# Patient Record
Sex: FEMALE | Race: BLACK OR AFRICAN AMERICAN | NOT HISPANIC OR LATINO
[De-identification: names, ages, dates, MRNs, and addresses within clinical notes are randomized per-mention and may not be internally consistent; named-entity substitution may affect disease eponyms.]

---

## 2017-02-13 ENCOUNTER — RX RENEWAL (OUTPATIENT)
Age: 57
End: 2017-02-13

## 2017-03-06 ENCOUNTER — RX RENEWAL (OUTPATIENT)
Age: 57
End: 2017-03-06

## 2017-03-09 ENCOUNTER — APPOINTMENT (OUTPATIENT)
Dept: CARDIOLOGY | Facility: CLINIC | Age: 57
End: 2017-03-09

## 2017-03-09 ENCOUNTER — NON-APPOINTMENT (OUTPATIENT)
Age: 57
End: 2017-03-09

## 2017-03-09 VITALS
WEIGHT: 190 LBS | SYSTOLIC BLOOD PRESSURE: 151 MMHG | HEIGHT: 60 IN | BODY MASS INDEX: 37.3 KG/M2 | HEART RATE: 71 BPM | DIASTOLIC BLOOD PRESSURE: 89 MMHG | OXYGEN SATURATION: 98 %

## 2017-05-15 ENCOUNTER — RX RENEWAL (OUTPATIENT)
Age: 57
End: 2017-05-15

## 2017-06-12 ENCOUNTER — RX RENEWAL (OUTPATIENT)
Age: 57
End: 2017-06-12

## 2017-08-11 ENCOUNTER — RX RENEWAL (OUTPATIENT)
Age: 57
End: 2017-08-11

## 2017-09-14 ENCOUNTER — APPOINTMENT (OUTPATIENT)
Dept: CARDIOLOGY | Facility: CLINIC | Age: 57
End: 2017-09-14

## 2017-12-07 ENCOUNTER — NON-APPOINTMENT (OUTPATIENT)
Age: 57
End: 2017-12-07

## 2017-12-07 ENCOUNTER — APPOINTMENT (OUTPATIENT)
Dept: CARDIOLOGY | Facility: CLINIC | Age: 57
End: 2017-12-07
Payer: COMMERCIAL

## 2017-12-07 VITALS
HEART RATE: 67 BPM | HEIGHT: 60 IN | OXYGEN SATURATION: 98 % | BODY MASS INDEX: 33.96 KG/M2 | SYSTOLIC BLOOD PRESSURE: 146 MMHG | WEIGHT: 173 LBS | DIASTOLIC BLOOD PRESSURE: 83 MMHG

## 2017-12-07 PROCEDURE — 93000 ELECTROCARDIOGRAM COMPLETE: CPT

## 2017-12-07 PROCEDURE — 99214 OFFICE O/P EST MOD 30 MIN: CPT

## 2017-12-22 ENCOUNTER — OTHER (OUTPATIENT)
Age: 57
End: 2017-12-22

## 2018-05-17 ENCOUNTER — NON-APPOINTMENT (OUTPATIENT)
Age: 58
End: 2018-05-17

## 2018-05-17 ENCOUNTER — APPOINTMENT (OUTPATIENT)
Dept: CARDIOLOGY | Facility: CLINIC | Age: 58
End: 2018-05-17
Payer: COMMERCIAL

## 2018-05-17 VITALS
SYSTOLIC BLOOD PRESSURE: 150 MMHG | WEIGHT: 175 LBS | HEART RATE: 58 BPM | OXYGEN SATURATION: 99 % | HEIGHT: 60 IN | BODY MASS INDEX: 34.36 KG/M2 | DIASTOLIC BLOOD PRESSURE: 78 MMHG

## 2018-05-17 PROCEDURE — 93000 ELECTROCARDIOGRAM COMPLETE: CPT

## 2018-05-17 PROCEDURE — 99214 OFFICE O/P EST MOD 30 MIN: CPT

## 2018-05-29 ENCOUNTER — RX RENEWAL (OUTPATIENT)
Age: 58
End: 2018-05-29

## 2018-12-10 ENCOUNTER — RX RENEWAL (OUTPATIENT)
Age: 58
End: 2018-12-10

## 2019-02-14 ENCOUNTER — NON-APPOINTMENT (OUTPATIENT)
Age: 59
End: 2019-02-14

## 2019-02-14 ENCOUNTER — APPOINTMENT (OUTPATIENT)
Dept: CARDIOLOGY | Facility: CLINIC | Age: 59
End: 2019-02-14
Payer: COMMERCIAL

## 2019-02-14 VITALS
WEIGHT: 188 LBS | SYSTOLIC BLOOD PRESSURE: 153 MMHG | HEART RATE: 80 BPM | DIASTOLIC BLOOD PRESSURE: 85 MMHG | HEIGHT: 60 IN | OXYGEN SATURATION: 99 % | BODY MASS INDEX: 36.91 KG/M2

## 2019-02-14 PROCEDURE — 93000 ELECTROCARDIOGRAM COMPLETE: CPT

## 2019-02-14 PROCEDURE — 99214 OFFICE O/P EST MOD 30 MIN: CPT

## 2019-02-14 NOTE — PHYSICAL EXAM
[General Appearance - Well Developed] : well developed [Normal Appearance] : normal appearance [Well Groomed] : well groomed [General Appearance - Well Nourished] : well nourished [No Deformities] : no deformities [General Appearance - In No Acute Distress] : no acute distress [Normal Conjunctiva] : the conjunctiva exhibited no abnormalities [Eyelids - No Xanthelasma] : the eyelids demonstrated no xanthelasmas [Normal Oral Mucosa] : normal oral mucosa [No Oral Pallor] : no oral pallor [No Oral Cyanosis] : no oral cyanosis [Normal Jugular Venous A Waves Present] : normal jugular venous A waves present [Normal Jugular Venous V Waves Present] : normal jugular venous V waves present [No Jugular Venous Nathan A Waves] : no jugular venous nathan A waves [Respiration, Rhythm And Depth] : normal respiratory rhythm and effort [Exaggerated Use Of Accessory Muscles For Inspiration] : no accessory muscle use [Auscultation Breath Sounds / Voice Sounds] : lungs were clear to auscultation bilaterally [Heart Rate And Rhythm] : heart rate and rhythm were normal [Heart Sounds] : normal S1 and S2 [Murmurs] : no murmurs present [Abdomen Soft] : soft [Abdomen Tenderness] : non-tender [Abdomen Mass (___ Cm)] : no abdominal mass palpated [Abnormal Walk] : normal gait [Gait - Sufficient For Exercise Testing] : the gait was sufficient for exercise testing [Nail Clubbing] : no clubbing of the fingernails [Cyanosis, Localized] : no localized cyanosis [Petechial Hemorrhages (___cm)] : no petechial hemorrhages [Skin Color & Pigmentation] : normal skin color and pigmentation [] : no rash [No Venous Stasis] : no venous stasis [Skin Lesions] : no skin lesions [No Skin Ulcers] : no skin ulcer [No Xanthoma] : no  xanthoma was observed [Oriented To Time, Place, And Person] : oriented to person, place, and time [Affect] : the affect was normal [Mood] : the mood was normal [No Anxiety] : not feeling anxious

## 2019-02-14 NOTE — HISTORY OF PRESENT ILLNESS
[FreeTextEntry1] : Rae returning for follow-up.\par No chest pains or tightness\par No palpitations\par Hasnt lost weight, doesn’t exercise\par Notes high BP recently\par Labs reviewed (A1C 6.1, LDL upto 98)\par \par

## 2019-02-14 NOTE — REVIEW OF SYSTEMS
[Anxiety] : anxiety [Negative] : Heme/Lymph [Dyspnea on exertion] : not dyspnea during exertion [Chest Pain] : no chest pain

## 2019-02-14 NOTE — DISCUSSION/SUMMARY
[FreeTextEntry1] : Cont with plavix due to bifurcation stents.\par Encouraged exercise and weight loss (if ldl remains at this level would consider uptitrating statin Rx)\par Change from Metoprolol to Chlorthalidone for BP control.\par F/u 6 mo\par To call me in 1-2 weeks with BP diary\par

## 2019-05-15 ENCOUNTER — RX RENEWAL (OUTPATIENT)
Age: 59
End: 2019-05-15

## 2019-06-11 ENCOUNTER — RX RENEWAL (OUTPATIENT)
Age: 59
End: 2019-06-11

## 2019-08-03 ENCOUNTER — INPATIENT (INPATIENT)
Facility: HOSPITAL | Age: 59
LOS: 0 days | Discharge: HOME | DRG: 309 | End: 2019-08-04
Attending: HOSPITALIST | Admitting: HOSPITALIST
Payer: COMMERCIAL

## 2019-08-03 VITALS
WEIGHT: 186.07 LBS | HEART RATE: 72 BPM | SYSTOLIC BLOOD PRESSURE: 125 MMHG | RESPIRATION RATE: 20 BRPM | OXYGEN SATURATION: 100 % | TEMPERATURE: 98 F | HEIGHT: 60 IN | DIASTOLIC BLOOD PRESSURE: 80 MMHG

## 2019-08-03 DIAGNOSIS — I25.10 ATHEROSCLEROTIC HEART DISEASE OF NATIVE CORONARY ARTERY WITHOUT ANGINA PECTORIS: Chronic | ICD-10-CM

## 2019-08-03 DIAGNOSIS — Z00.00 ENCOUNTER FOR GENERAL ADULT MEDICAL EXAMINATION WITHOUT ABNORMAL FINDINGS: ICD-10-CM

## 2019-08-03 DIAGNOSIS — I25.10 ATHEROSCLEROTIC HEART DISEASE OF NATIVE CORONARY ARTERY WITHOUT ANGINA PECTORIS: ICD-10-CM

## 2019-08-03 DIAGNOSIS — N17.9 ACUTE KIDNEY FAILURE, UNSPECIFIED: ICD-10-CM

## 2019-08-03 DIAGNOSIS — I10 ESSENTIAL (PRIMARY) HYPERTENSION: ICD-10-CM

## 2019-08-03 DIAGNOSIS — M25.511 PAIN IN RIGHT SHOULDER: ICD-10-CM

## 2019-08-03 DIAGNOSIS — R00.2 PALPITATIONS: ICD-10-CM

## 2019-08-03 LAB
ALBUMIN SERPL ELPH-MCNC: 4.7 G/DL — SIGNIFICANT CHANGE UP (ref 3.3–5)
ALP SERPL-CCNC: 217 U/L — HIGH (ref 40–120)
ALT FLD-CCNC: 24 U/L — SIGNIFICANT CHANGE UP (ref 10–45)
ANION GAP SERPL CALC-SCNC: 13 MMOL/L — SIGNIFICANT CHANGE UP (ref 5–17)
APPEARANCE UR: CLEAR — SIGNIFICANT CHANGE UP
AST SERPL-CCNC: 25 U/L — SIGNIFICANT CHANGE UP (ref 10–40)
BACTERIA # UR AUTO: NEGATIVE — SIGNIFICANT CHANGE UP
BASOPHILS # BLD AUTO: 0.1 K/UL — SIGNIFICANT CHANGE UP (ref 0–0.2)
BASOPHILS NFR BLD AUTO: 1.5 % — SIGNIFICANT CHANGE UP (ref 0–2)
BILIRUB SERPL-MCNC: 0.3 MG/DL — SIGNIFICANT CHANGE UP (ref 0.2–1.2)
BILIRUB UR-MCNC: NEGATIVE — SIGNIFICANT CHANGE UP
BUN SERPL-MCNC: 39 MG/DL — HIGH (ref 7–23)
CALCIUM SERPL-MCNC: 9.9 MG/DL — SIGNIFICANT CHANGE UP (ref 8.4–10.5)
CHLORIDE SERPL-SCNC: 97 MMOL/L — SIGNIFICANT CHANGE UP (ref 96–108)
CO2 SERPL-SCNC: 29 MMOL/L — SIGNIFICANT CHANGE UP (ref 22–31)
COLOR SPEC: SIGNIFICANT CHANGE UP
CREAT ?TM UR-MCNC: 222 MG/DL — SIGNIFICANT CHANGE UP
CREAT SERPL-MCNC: 1.67 MG/DL — HIGH (ref 0.5–1.3)
DIFF PNL FLD: NEGATIVE — SIGNIFICANT CHANGE UP
EOSINOPHIL # BLD AUTO: 0.1 K/UL — SIGNIFICANT CHANGE UP (ref 0–0.5)
EOSINOPHIL NFR BLD AUTO: 1.5 % — SIGNIFICANT CHANGE UP (ref 0–6)
EPI CELLS # UR: 2 /HPF — SIGNIFICANT CHANGE UP
GLUCOSE SERPL-MCNC: 113 MG/DL — HIGH (ref 70–99)
GLUCOSE UR QL: NEGATIVE — SIGNIFICANT CHANGE UP
HCT VFR BLD CALC: 40.6 % — SIGNIFICANT CHANGE UP (ref 34.5–45)
HGB BLD-MCNC: 12.9 G/DL — SIGNIFICANT CHANGE UP (ref 11.5–15.5)
HYALINE CASTS # UR AUTO: 1 /LPF — SIGNIFICANT CHANGE UP (ref 0–2)
KETONES UR-MCNC: NEGATIVE — SIGNIFICANT CHANGE UP
LEUKOCYTE ESTERASE UR-ACNC: ABNORMAL
LYMPHOCYTES # BLD AUTO: 2.9 K/UL — SIGNIFICANT CHANGE UP (ref 1–3.3)
LYMPHOCYTES # BLD AUTO: 38.6 % — SIGNIFICANT CHANGE UP (ref 13–44)
MCHC RBC-ENTMCNC: 28.5 PG — SIGNIFICANT CHANGE UP (ref 27–34)
MCHC RBC-ENTMCNC: 31.9 GM/DL — LOW (ref 32–36)
MCV RBC AUTO: 89.4 FL — SIGNIFICANT CHANGE UP (ref 80–100)
MONOCYTES # BLD AUTO: 0.6 K/UL — SIGNIFICANT CHANGE UP (ref 0–0.9)
MONOCYTES NFR BLD AUTO: 8.1 % — SIGNIFICANT CHANGE UP (ref 2–14)
NEUTROPHILS # BLD AUTO: 3.8 K/UL — SIGNIFICANT CHANGE UP (ref 1.8–7.4)
NEUTROPHILS NFR BLD AUTO: 50.3 % — SIGNIFICANT CHANGE UP (ref 43–77)
NITRITE UR-MCNC: NEGATIVE — SIGNIFICANT CHANGE UP
PH UR: 6.5 — SIGNIFICANT CHANGE UP (ref 5–8)
PLATELET # BLD AUTO: 448 K/UL — HIGH (ref 150–400)
POTASSIUM SERPL-MCNC: 3.4 MMOL/L — LOW (ref 3.5–5.3)
POTASSIUM SERPL-SCNC: 3.4 MMOL/L — LOW (ref 3.5–5.3)
PROT SERPL-MCNC: 8.6 G/DL — HIGH (ref 6–8.3)
PROT UR-MCNC: ABNORMAL
RBC # BLD: 4.54 M/UL — SIGNIFICANT CHANGE UP (ref 3.8–5.2)
RBC # FLD: 12.2 % — SIGNIFICANT CHANGE UP (ref 10.3–14.5)
RBC CASTS # UR COMP ASSIST: 4 /HPF — SIGNIFICANT CHANGE UP (ref 0–4)
SODIUM SERPL-SCNC: 139 MMOL/L — SIGNIFICANT CHANGE UP (ref 135–145)
SODIUM UR-SCNC: 89 MMOL/L — SIGNIFICANT CHANGE UP
SP GR SPEC: 1.02 — SIGNIFICANT CHANGE UP (ref 1.01–1.02)
TROPONIN T, HIGH SENSITIVITY RESULT: 6 NG/L — SIGNIFICANT CHANGE UP (ref 0–51)
TROPONIN T, HIGH SENSITIVITY RESULT: 7 NG/L — SIGNIFICANT CHANGE UP (ref 0–51)
UROBILINOGEN FLD QL: NEGATIVE — SIGNIFICANT CHANGE UP
WBC # BLD: 7.5 K/UL — SIGNIFICANT CHANGE UP (ref 3.8–10.5)
WBC # FLD AUTO: 7.5 K/UL — SIGNIFICANT CHANGE UP (ref 3.8–10.5)
WBC UR QL: 3 /HPF — SIGNIFICANT CHANGE UP (ref 0–5)

## 2019-08-03 PROCEDURE — 71045 X-RAY EXAM CHEST 1 VIEW: CPT | Mod: 26

## 2019-08-03 PROCEDURE — 99285 EMERGENCY DEPT VISIT HI MDM: CPT | Mod: 25

## 2019-08-03 PROCEDURE — 93010 ELECTROCARDIOGRAM REPORT: CPT

## 2019-08-03 PROCEDURE — 99223 1ST HOSP IP/OBS HIGH 75: CPT | Mod: GC

## 2019-08-03 RX ORDER — CLOPIDOGREL BISULFATE 75 MG/1
75 TABLET, FILM COATED ORAL DAILY
Refills: 0 | Status: DISCONTINUED | OUTPATIENT
Start: 2019-08-03 | End: 2019-08-04

## 2019-08-03 RX ORDER — HYDRALAZINE HCL 50 MG
5 TABLET ORAL EVERY 4 HOURS
Refills: 0 | Status: DISCONTINUED | OUTPATIENT
Start: 2019-08-03 | End: 2019-08-04

## 2019-08-03 RX ORDER — SODIUM CHLORIDE 9 MG/ML
1000 INJECTION INTRAMUSCULAR; INTRAVENOUS; SUBCUTANEOUS ONCE
Refills: 0 | Status: COMPLETED | OUTPATIENT
Start: 2019-08-03 | End: 2019-08-03

## 2019-08-03 RX ORDER — ACETAMINOPHEN 500 MG
975 TABLET ORAL ONCE
Refills: 0 | Status: COMPLETED | OUTPATIENT
Start: 2019-08-03 | End: 2019-08-03

## 2019-08-03 RX ORDER — SODIUM CHLORIDE 9 MG/ML
1000 INJECTION INTRAMUSCULAR; INTRAVENOUS; SUBCUTANEOUS
Refills: 0 | Status: DISCONTINUED | OUTPATIENT
Start: 2019-08-03 | End: 2019-08-04

## 2019-08-03 RX ORDER — SIMVASTATIN 20 MG/1
10 TABLET, FILM COATED ORAL AT BEDTIME
Refills: 0 | Status: DISCONTINUED | OUTPATIENT
Start: 2019-08-03 | End: 2019-08-04

## 2019-08-03 RX ORDER — POTASSIUM CHLORIDE 20 MEQ
20 PACKET (EA) ORAL ONCE
Refills: 0 | Status: COMPLETED | OUTPATIENT
Start: 2019-08-03 | End: 2019-08-03

## 2019-08-03 RX ADMIN — SODIUM CHLORIDE 70 MILLILITER(S): 9 INJECTION INTRAMUSCULAR; INTRAVENOUS; SUBCUTANEOUS at 22:47

## 2019-08-03 RX ADMIN — CLOPIDOGREL BISULFATE 75 MILLIGRAM(S): 75 TABLET, FILM COATED ORAL at 22:46

## 2019-08-03 RX ADMIN — SIMVASTATIN 10 MILLIGRAM(S): 20 TABLET, FILM COATED ORAL at 22:46

## 2019-08-03 RX ADMIN — Medication 20 MILLIEQUIVALENT(S): at 09:40

## 2019-08-03 RX ADMIN — Medication 975 MILLIGRAM(S): at 09:40

## 2019-08-03 RX ADMIN — SODIUM CHLORIDE 1000 MILLILITER(S): 9 INJECTION INTRAMUSCULAR; INTRAVENOUS; SUBCUTANEOUS at 09:41

## 2019-08-03 NOTE — H&P ADULT - NSICDXPASTSURGICALHX_GEN_ALL_CORE_FT
PAST SURGICAL HISTORY:  CAD (coronary artery disease) S/p Stent to LAD 2015    H/O:      H/O: hysterectomy

## 2019-08-03 NOTE — H&P ADULT - PROBLEM SELECTOR PLAN 3
Given brief self resolving episode of palpitations most likely SVT vs a fib or other tachyarrhythmia. Will monitor on tele overnight.  -check TSH.

## 2019-08-03 NOTE — H&P ADULT - NSHPPHYSICALEXAM_GEN_ALL_CORE
GENERAL: NAD, lying in bed comfortably  HEAD:  Atraumatic, Normocephalic  EYES: EOMI, PERRLA, conjunctiva and sclera clear  ENT: Moist mucous membranes  NECK: Supple, No JVD  CHEST/LUNG: Clear to auscultation bilaterally; No rales, rhonchi, wheezing, or rubs. Unlabored respirations  HEART: Regular rate and rhythm; normal S1 and S2. No murmurs, rubs, or gallops  ABDOMEN: Bowel sounds present; Soft, Nontender, Nondistended. No hepatomegally  EXTREMITIES:  2+ Peripheral Pulses, brisk capillary refill. No clubbing, cyanosis, or edema  NERVOUS SYSTEM:  Alert & Oriented X3, speech clear. No deficits   MSK: FROM all 4 extremities, full and equal strength. Drop can test negative. Patient with tenderness to palpation at the pec minor bilaterally. Sharp grinding pain on shoulder abduction and external rotation.   SKIN: No rashes or lesions

## 2019-08-03 NOTE — ED ADULT NURSE NOTE - OBJECTIVE STATEMENT
58YOF pmh HTN, HLD, stents 2015 presents to ED c/o "rapid heartbeat." Pt state she went to the park on Thursday and felt weak/ diaphoretic and came home. Pt states she started having "rapid hearbeat" yesterday and has not feeling normal so came to the ED today. Pt also endorses nausea. Denies CP, jaw, arm pain, SOB, fever, chills, abd pain, burning upon urination. Pt put on cardiac monitor. EKG done. Safety and comfort measures provided. Will continue to monitor.

## 2019-08-03 NOTE — H&P ADULT - ASSESSMENT
Ms. Benjamin is a 58 woman w/HTN, HLD, CAD (s/p stent to LAD and D1) presents who is presenting with a brief episode of palpitations that has now resolved.

## 2019-08-03 NOTE — H&P ADULT - HISTORY OF PRESENT ILLNESS
Ms. Benjamin is a 58 woman w/HTN, HLD, CAD (s/p stent to LAD and D1) presents with palpitations. Her symptoms started yesterday by 7:30am when she was out for her usual morning walk. She had a sudden onset of palpitations, SOB, weakness, lightheadedness, and diaphoresis. Her symptoms did not improve with rest when she stopped walking. Her symptoms persisted for about 5 minutes while she walked home and did not resolve until she got home and lied down. She had no chest pain at any point. She has had no symptoms since that time including no chest pain. She has however had reduced appetite and nausea through that day. She was still tolerating foods PO and fluids but just had a reduced appetite. She does have a good appetite now. She did not have any caffeine prior to her presentation. She is active at her baseline and goes to the gym and goes on the elliptical. This presentation has been distinctly different than her past admissions for CAD. No history of thyroid disease. She has good follow up with cardiology and follows every 6 months. Her next appointment is in 6 days on Friday. Endorses ongoing left shoulder pain for the past month. She denies any urinary symptoms.        with palpitations associated with nausea, diaphoresis and weakness while patient was walking. Patient also had these symptoms while at rest. She had a normal stress test in 2016. No caffein.  No chest pain/neck pain/jaw pain. No vomiting. No SOB/orthopnea/PND. She has an appointment with her cardiologist in 3 days. Ms. Benjamin is a 58 woman w/HTN, HLD, CAD (s/p stent to LAD and D1) presents with palpitations. Her symptoms started yesterday by 7:30am when she was out for her usual morning walk. She had a sudden onset of palpitations, SOB, weakness, lightheadedness, and diaphoresis. Her symptoms did not improve with rest when she stopped walking. Her symptoms persisted for about 5 minutes while she walked home and did not resolve until she got home and lied down. She had no chest pain at any point. She has had no symptoms since that time including no chest pain. She has however had reduced appetite and nausea through that day. She was still tolerating foods PO and fluids but just had a reduced appetite. She does have a good appetite now. She did not have any caffeine prior to her presentation. She is active at her baseline and goes to the gym and goes on the elliptical. This presentation has been distinctly different than her past admissions for CAD. No history of thyroid disease. She has good follow up with cardiology and follows every 6 months. Her next appointment is in 6 days on Friday. Endorses ongoing left shoulder pain for the past month. She denies any urinary symptoms.

## 2019-08-03 NOTE — ED PROVIDER NOTE - PHYSICAL EXAMINATION
GENERAL: NAD  HEAD:  Atraumatic, Normocephalic  EYES: EOMI, conjunctiva and sclera clear  NECK: Supple, No JVD  CHEST/LUNG: Clear to auscultation bilaterally; No wheeze  HEART: Regular rate and rhythm; No murmurs, rubs, or gallops  ABDOMEN: Soft, Nontender, Nondistended; Bowel sounds present  EXTREMITIES:  2+ Peripheral Pulses, No clubbing, cyanosis, or edema  PSYCH: AAOx3  NEUROLOGY: non-focal  SKIN: No rashes or lesions GENERAL: NAD  HEAD:  Atraumatic, Normocephalic  EYES: EOMI, conjunctiva and sclera clear  NECK: Supple, No JVD  CHEST/LUNG: Clear to auscultation bilaterally; No wheeze  HEART: Regular rate and rhythm; No murmurs, rubs, or gallops  ABDOMEN: Soft, Nontender, Nondistended; Bowel sounds present  EXTREMITIES:  2+ Peripheral Pulses, No clubbing, cyanosis, or edema  MSK: Tenderness on anterior shoulder   PSYCH: AAOx3  NEUROLOGY: non-focal  SKIN: No rashes or lesions

## 2019-08-03 NOTE — ED PROVIDER NOTE - OBJECTIVE STATEMENT
58F w/HTN, HLD, CAD (s/p stent to LAD and D1) presents with palpitations. Symptom started yesterday by 7:30am with palpitations associated with nausea, diaphoresis and weakness while patient was walking. Patient also had these symptoms while at rest. She had a normal stress test in 2016. No caffein. No history of thyroid disease. No chest pain/neck pain/jaw pain. No vomiting. No SOB/orthopnea/PND. 58F w/HTN, HLD, CAD (s/p stent to LAD and D1) presents with palpitations. Symptom started yesterday by 7:30am with palpitations associated with nausea, diaphoresis and weakness while patient was walking. Patient also had these symptoms while at rest. She had a normal stress test in 2016. No caffein. No history of thyroid disease. Endorses left shoulder pain for the past month. No chest pain/neck pain/jaw pain. No vomiting. No SOB/orthopnea/PND. She has an appointment with her cardiologist in 3 days. 58F w/HTN, HLD, CAD (s/p stent to LAD and D1) presents with palpitations. Symptom started yesterday by 7:30am with palpitations associated with nausea, diaphoresis and weakness while patient was walking. Patient also had these symptoms while at rest. She had a normal stress test in 2016. No caffein. No history of thyroid disease. Endorses ongoing left shoulder pain for the past month. No chest pain/neck pain/jaw pain. No vomiting. No SOB/orthopnea/PND. She has an appointment with her cardiologist in 3 days.

## 2019-08-03 NOTE — H&P ADULT - PROBLEM SELECTOR PLAN 2
Trops negative. EKG NSR and unchanged. No CP.   -monitor on tele  -No need for stress test or echo as patient has good follow up and will have outpatient appointment with her cardiologist later this week.

## 2019-08-03 NOTE — ED PROVIDER NOTE - NS ED ROS FT
CONSTITUTIONAL: No fevers   EYES/ENT: No visual changes;  No  throat pain   NECK: No pain   RESPIRATORY: No cough, wheezing, hemoptysis; No shortness of breath  CARDIOVASCULAR: +palpitations. No chest pain.  GASTROINTESTINAL: +nausea. No abdominal pain. vomiting, or hematemesis; No diarrhea or constipation. No melena or hematochezia.  GENITOURINARY: No dysuria, frequency or hematuria  NEUROLOGICAL: No numbness or weakness  SKIN: No itching, rashes

## 2019-08-03 NOTE — H&P ADULT - NSHPSOCIALHISTORY_GEN_ALL_CORE
Pt splits time between [place], [place] and [place]. Tries to get her medical care in [place]. She lives alone in [place] and lives with her son in [place]. She drinks socially, never smoker, no illicit. 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

## 2019-08-03 NOTE — ED PROVIDER NOTE - CLINICAL SUMMARY MEDICAL DECISION MAKING FREE TEXT BOX
58 F w/ PMH Of CAD w/ stents, prediabetes, HTN, p/w near syncope and generalized weakness that occurred yesterday at 730 PM while she was ambulating. PT states she never lost consciousness. Pt denies an cp, sob. States that nearsycopal symptoms stopped when she was resting. Pt reports that she went to bed and when she woke up had continued weakness. Pt denies any fevers, chills, nausea, vomiting. Pt on exam, is nontoxic appearing- pt is not orthostatic, was given IVF, pt has clear lungs and mild edema in the ankles, pt has no abdominal pain, heart is regular rate and rhythm. Pt will have labs, ekg and troponin pt has evidence of tamara will be admitted in addition to cardiac testing and risk stratification for possible cardiac diagnostic testing as the etiology of her exertional near syncope.

## 2019-08-03 NOTE — H&P ADULT - PROBLEM SELECTOR PLAN 1
Cr=1.67  -Baseline=0.74 in 2/2019  FENa 0.5% c/w pre-renal etiology in s/o possible poor PO intake with nausea and loss of appetite. Pt on chlorthalidone so checking FEUrea  -holding chlorthalidone and lisinopril in s/o KIRIT   -s/p bolus of fluid in the ED. Starting 70 cc/hr NS overnight.

## 2019-08-03 NOTE — H&P ADULT - NSHPLABSRESULTS_GEN_ALL_CORE
12.9   7.5   )-----------( 448      ( 03 Aug 2019 08:38 )             40.6         139  |  97  |  39<H>  ----------------------------<  113<H>  3.4<L>   |  29  |  1.67<H>    Ca    9.9      03 Aug 2019 08:38  Phos  3.3       Mg     2.3         TPro  8.6<H>  /  Alb  4.7  /  TBili  0.3  /  DBili  x   /  AST  25  /  ALT  24  /  AlkPhos  217<H>          LIVER FUNCTIONS - ( 03 Aug 2019 08:38 )  Alb: 4.7 g/dL / Pro: 8.6 g/dL / ALK PHOS: 217 U/L / ALT: 24 U/L / AST: 25 U/L / GGT: x             Urinalysis Basic - ( 03 Aug 2019 10:10 )    Color: Light Yellow / Appearance: Clear / S.024 / pH: x  Gluc: x / Ketone: Negative  / Bili: Negative / Urobili: Negative   Blood: x / Protein: Trace / Nitrite: Negative   Leuk Esterase: Small / RBC: 4 /hpf / WBC 3 /HPF   Sq Epi: x / Non Sq Epi: 2 /hpf / Bacteria: Negative          Blood, Urine: Negative ( @ 10:10)      FENa=0.5                      EKG: normal sinus rhythm. Unchanged from prior ekg    RADIOLOGY STUDIES:

## 2019-08-03 NOTE — H&P ADULT - NSHPREVIEWOFSYSTEMS_GEN_ALL_CORE
REVIEW OF SYSTEMS:    CONSTITUTIONAL: No weakness, fevers or chills  EYES/ENT: No visual changes;  No vertigo or throat pain   NECK: No pain or stiffness  RESPIRATORY: No cough, wheezing, hemoptysis; No shortness of breath  CARDIOVASCULAR: No chest pain, + palpitations  GASTROINTESTINAL: No abdominal or epigastric pain. + nausea,. No vomiting, or hematemesis; No diarrhea or constipation. No melena or hematochezia.  GENITOURINARY: No dysuria, frequency or hematuria  NEUROLOGICAL: No numbness or weakness  SKIN: No itching, burning, rashes, or lesions   MSK: She has bilateral shoulder pain that is sharp and worse with should flexion. No tenderness to palpation. Following with PCP for this.   All other review of systems is negative unless indicated above.

## 2019-08-04 ENCOUNTER — TRANSCRIPTION ENCOUNTER (OUTPATIENT)
Age: 59
End: 2019-08-04

## 2019-08-04 VITALS
RESPIRATION RATE: 18 BRPM | DIASTOLIC BLOOD PRESSURE: 62 MMHG | HEART RATE: 58 BPM | OXYGEN SATURATION: 98 % | SYSTOLIC BLOOD PRESSURE: 106 MMHG | TEMPERATURE: 98 F

## 2019-08-04 LAB
ALBUMIN SERPL ELPH-MCNC: 3.7 G/DL — SIGNIFICANT CHANGE UP (ref 3.3–5)
ALP SERPL-CCNC: 178 U/L — HIGH (ref 40–120)
ALT FLD-CCNC: 17 U/L — SIGNIFICANT CHANGE UP (ref 10–45)
ANION GAP SERPL CALC-SCNC: 11 MMOL/L — SIGNIFICANT CHANGE UP (ref 5–17)
AST SERPL-CCNC: 19 U/L — SIGNIFICANT CHANGE UP (ref 10–40)
BILIRUB SERPL-MCNC: 0.4 MG/DL — SIGNIFICANT CHANGE UP (ref 0.2–1.2)
BUN SERPL-MCNC: 24 MG/DL — HIGH (ref 7–23)
CALCIUM SERPL-MCNC: 9.4 MG/DL — SIGNIFICANT CHANGE UP (ref 8.4–10.5)
CHLORIDE SERPL-SCNC: 100 MMOL/L — SIGNIFICANT CHANGE UP (ref 96–108)
CO2 SERPL-SCNC: 27 MMOL/L — SIGNIFICANT CHANGE UP (ref 22–31)
CREAT SERPL-MCNC: 1.05 MG/DL — SIGNIFICANT CHANGE UP (ref 0.5–1.3)
GLUCOSE SERPL-MCNC: 98 MG/DL — SIGNIFICANT CHANGE UP (ref 70–99)
HCT VFR BLD CALC: 34.5 % — SIGNIFICANT CHANGE UP (ref 34.5–45)
HCV AB S/CO SERPL IA: 0.13 S/CO — SIGNIFICANT CHANGE UP (ref 0–0.99)
HCV AB SERPL-IMP: SIGNIFICANT CHANGE UP
HGB BLD-MCNC: 11.2 G/DL — LOW (ref 11.5–15.5)
MAGNESIUM SERPL-MCNC: 1.9 MG/DL — SIGNIFICANT CHANGE UP (ref 1.6–2.6)
MCHC RBC-ENTMCNC: 29.3 PG — SIGNIFICANT CHANGE UP (ref 27–34)
MCHC RBC-ENTMCNC: 32.5 GM/DL — SIGNIFICANT CHANGE UP (ref 32–36)
MCV RBC AUTO: 90.3 FL — SIGNIFICANT CHANGE UP (ref 80–100)
PHOSPHATE SERPL-MCNC: 3.1 MG/DL — SIGNIFICANT CHANGE UP (ref 2.5–4.5)
PLATELET # BLD AUTO: 374 K/UL — SIGNIFICANT CHANGE UP (ref 150–400)
POTASSIUM SERPL-MCNC: 4.1 MMOL/L — SIGNIFICANT CHANGE UP (ref 3.5–5.3)
POTASSIUM SERPL-SCNC: 4.1 MMOL/L — SIGNIFICANT CHANGE UP (ref 3.5–5.3)
PROT SERPL-MCNC: 7 G/DL — SIGNIFICANT CHANGE UP (ref 6–8.3)
RBC # BLD: 3.83 M/UL — SIGNIFICANT CHANGE UP (ref 3.8–5.2)
RBC # FLD: 12 % — SIGNIFICANT CHANGE UP (ref 10.3–14.5)
SODIUM SERPL-SCNC: 138 MMOL/L — SIGNIFICANT CHANGE UP (ref 135–145)
TSH SERPL-MCNC: 0.92 UIU/ML — SIGNIFICANT CHANGE UP (ref 0.27–4.2)
WBC # BLD: 5.7 K/UL — SIGNIFICANT CHANGE UP (ref 3.8–10.5)
WBC # FLD AUTO: 5.7 K/UL — SIGNIFICANT CHANGE UP (ref 3.8–10.5)

## 2019-08-04 PROCEDURE — 82570 ASSAY OF URINE CREATININE: CPT

## 2019-08-04 PROCEDURE — 85027 COMPLETE CBC AUTOMATED: CPT

## 2019-08-04 PROCEDURE — 81001 URINALYSIS AUTO W/SCOPE: CPT

## 2019-08-04 PROCEDURE — 84484 ASSAY OF TROPONIN QUANT: CPT

## 2019-08-04 PROCEDURE — 71045 X-RAY EXAM CHEST 1 VIEW: CPT

## 2019-08-04 PROCEDURE — 84300 ASSAY OF URINE SODIUM: CPT

## 2019-08-04 PROCEDURE — 99285 EMERGENCY DEPT VISIT HI MDM: CPT | Mod: 25

## 2019-08-04 PROCEDURE — 93005 ELECTROCARDIOGRAM TRACING: CPT

## 2019-08-04 PROCEDURE — 99239 HOSP IP/OBS DSCHRG MGMT >30: CPT

## 2019-08-04 PROCEDURE — 84100 ASSAY OF PHOSPHORUS: CPT

## 2019-08-04 PROCEDURE — 84443 ASSAY THYROID STIM HORMONE: CPT

## 2019-08-04 PROCEDURE — 86803 HEPATITIS C AB TEST: CPT

## 2019-08-04 PROCEDURE — 80053 COMPREHEN METABOLIC PANEL: CPT

## 2019-08-04 PROCEDURE — 83735 ASSAY OF MAGNESIUM: CPT

## 2019-08-04 RX ORDER — LISINOPRIL 2.5 MG/1
1 TABLET ORAL
Qty: 30 | Refills: 0
Start: 2019-08-04 | End: 2019-09-02

## 2019-08-04 RX ORDER — SODIUM CHLORIDE 9 MG/ML
1000 INJECTION INTRAMUSCULAR; INTRAVENOUS; SUBCUTANEOUS
Refills: 0 | Status: DISCONTINUED | OUTPATIENT
Start: 2019-08-04 | End: 2019-08-04

## 2019-08-04 RX ADMIN — CLOPIDOGREL BISULFATE 75 MILLIGRAM(S): 75 TABLET, FILM COATED ORAL at 13:18

## 2019-08-04 NOTE — DISCHARGE NOTE PROVIDER - NSFOLLOWUPCLINICS_GEN_ALL_ED_FT
[facility]  Cardiology  [address] [place] [id number]  Phone: [phone]  Fax:   Follow Up Time:

## 2019-08-04 NOTE — PROGRESS NOTE ADULT - SUBJECTIVE AND OBJECTIVE BOX
Cem Aceves  Internal Medicine PGY-1  Pager: [phone] / [phone]      PATIENT:  ELLEN COWART  [id number]    INTERVAL HISTORY/OVERNIGHT EVENTS:  NAEON. NSR on tele    SUBJECTIVE:  No subjective complaints. No CP, palpitations, LH, SOB, dizziness.     OBJECTIVE:    T(C): 36.7 (19 @ 05:01), Max: 37 (19 @ 12:43)  HR: 63 (19 @ 05:01) (59 - 65)  BP: 118/69 (19 @ 05:01) (100/61 - 118/69)  RR: 18 (19 @ 05:01) (18 - 18)  SpO2: 100% (19 @ 05:01) (98% - 100%)      19 @ 07:01  -  19 @ 07:00  --------------------------------------------------------  IN: 480 mL / OUT: 0 mL / NET: 480 mL    19 @ 07:01  -  19 @ 09:47  --------------------------------------------------------  IN: 240 mL / OUT: 0 mL / NET: 240 mL          PHYSICAL EXAM:  GENERAL: NAD, lying in bed comfortably  	HEAD:  Atraumatic, Normocephalic  	EYES: EOMI, PERRLA, conjunctiva and sclera clear  	ENT: Moist mucous membranes  	NECK: Supple, No JVD  	CHEST/LUNG: Clear to auscultation bilaterally; No rales, rhonchi, wheezing, or rubs. Unlabored respirations  	HEART: Regular rate and rhythm; normal S1 and S2. No murmurs, rubs, or gallops  	ABDOMEN: Bowel sounds present; Soft, Nontender, Nondistended. No hepatomegally  	EXTREMITIES:  2+ Peripheral Pulses, brisk capillary refill. No clubbing, cyanosis, or edema  	NERVOUS SYSTEM:  Alert & Oriented X3, speech clear. No deficits   	MSK: FROM all 4 extremities, full and equal strength. Drop can test negative. Patient with tenderness to palpation at the pec minor bilaterally. Sharp grinding pain on shoulder abduction and external rotation.   SKIN: No rashes or lesions            LABS:                          11.2   5.7   )-----------( 374      ( 04 Aug 2019 07:13 )             34.5     08-04    138  |  100  |  24<H>  ----------------------------<  98  4.1   |  27  |  1.05    Ca    9.4      04 Aug 2019 07:13  Phos  3.1     08-04  Mg     1.9     08-04    TPro  7.0  /  Alb  3.7  /  TBili  0.4  /  DBili  x   /  AST  19  /  ALT  17  /  AlkPhos  178<H>  08-04    LIVER FUNCTIONS - ( 04 Aug 2019 07:13 )  Alb: 3.7 g/dL / Pro: 7.0 g/dL / ALK PHOS: 178 U/L / ALT: 17 U/L / AST: 19 U/L / GGT: x                     Urinalysis Basic - ( 03 Aug 2019 10:10 )    Color: Light Yellow / Appearance: Clear / S.024 / pH: x  Gluc: x / Ketone: Negative  / Bili: Negative / Urobili: Negative   Blood: x / Protein: Trace / Nitrite: Negative   Leuk Esterase: Small / RBC: 4 /hpf / WBC 3 /HPF   Sq Epi: x / Non Sq Epi: 2 /hpf / Bacteria: Negative                IMAGING:      MEDICATIONS:  MEDICATIONS  (STANDING):  clopidogrel Tablet 75 milliGRAM(s) Oral daily  simvastatin 10 milliGRAM(s) Oral at bedtime  sodium chloride 0.9%. 1000 milliLiter(s) (70 mL/Hr) IV Continuous <Continuous>    MEDICATIONS  (PRN):  hydrALAZINE 5 milliGRAM(s) Oral every 4 hours PRN SBP >180

## 2019-08-04 NOTE — DISCHARGE NOTE PROVIDER - CARE PROVIDERS DIRECT ADDRESSES
[email or web address]

## 2019-08-04 NOTE — DISCHARGE NOTE PROVIDER - CARE PROVIDER_API CALL
Pat Leonard (MD)  Cardiovascular Disease; Interventional Cardiology  [address] [place] [address]  Phone: [phone]  Fax: [phone]  Follow Up Time:

## 2019-08-04 NOTE — PROGRESS NOTE ADULT - PROBLEM SELECTOR PLAN 1
Cr=1.67  -Baseline=0.74 in 2/2019  FENa 0.5% c/w pre-renal etiology in s/o possible poor PO intake with nausea and loss of appetite. Pt on chlorthalidone so checking FEUrea  -holding chlorthalidone and lisinopril in s/o KIRIT   -s/p bolus of fluid in the ED. 70 cc/hr NS overnight.  -Cr 1.67->1.05

## 2019-08-04 NOTE — PROGRESS NOTE ADULT - PROBLEM SELECTOR PLAN 3
Given brief self resolving episode of palpitations most likely SVT vs a fib or other tachyarrhythmia. Will monitor on tele overnight.  -TSH pending

## 2019-08-04 NOTE — DISCHARGE NOTE PROVIDER - NSDCCPCAREPLAN_GEN_ALL_CORE_FT
PRINCIPAL DISCHARGE DIAGNOSIS  Diagnosis: Palpitation  Assessment and Plan of Treatment: You remained asymptomatic during her hospitalization. Your troponin levels were normal which is a lab test we use to determine if heart cells are dying. You also had an EKG and a chest X-ray that were normal. Given this brief self resolving episode of palpitations it is most likely that you had a breif episode of tachycardia or fast heart beat. When the heart beats very fast it isn't able to fill up with blood in between beats which can cause reduced blood flow and symptoms of weakness, light headedness, and shortness of breath like you described. You were monitored on telemetry which is a continuous heart monitor and everything looked completely normal. We also checked labs to evaluate your thyroid function which can cause tachycardia and those labs were also normal.          SECONDARY DISCHARGE DIAGNOSES  Diagnosis: KIRIT (acute kidney injury)  Assessment and Plan of Treatment: When you were admitted you were found to have a small and reversible injury to your kidneys called an acute kidney injury or KIRIT. We discovered this by checking a lab value called the creatinine. This was due to dehydration, poor fluid intake, and use of your diuretic and other blood pressure meds. We gave you some fluids in the ED and overnight and by the morning on the day of your discharge this had completely resolved. You do not need to do anything to follow up about this.       Diagnosis: Hypertension, unspecified type  Assessment and Plan of Treatment: We held your chlorithalidone and lisinopril in s/o of KIRIT. Your blood pressures were in the 100s to 110s systolic without these meds and so you are being discharged on 10mg lisinopril and no chlorithalidone. You should follow up about this with your cardiologist later this week.

## 2019-08-04 NOTE — PROGRESS NOTE ADULT - ATTENDING COMMENTS
No events on tele. KIRIT resolved w hydration. BP remains low off antihypertensives.     Discharge off Chlorthalidone and on low dose Lisinopril. F/u with Cardiologist this week.     D/c time 40 mins.

## 2019-08-04 NOTE — DISCHARGE NOTE NURSING/CASE MANAGEMENT/SOCIAL WORK - NSDCDPATPORTLINK_GEN_ALL_CORE
You can access the [facility] Patient Portal, offered by [facility], by registering with the following website: [email or web address]

## 2019-08-04 NOTE — PROGRESS NOTE ADULT - PROBLEM SELECTOR PLAN 2
Trops negative. EKG NSR and unchanged. No CP.   -NSR on tele with no events  -No need for stress test or echo as patient has good follow up and will have outpatient appointment with her cardiologist later this week.

## 2019-08-04 NOTE — DISCHARGE NOTE PROVIDER - HOSPITAL COURSE
HPI:    Ms. Benjamin is a 58 woman w/HTN, HLD, CAD (s/p stent to LAD and D1) presents with palpitations. Her symptoms started yesterday by 7:30am when she was out for her usual morning walk. She had a sudden onset of palpitations, SOB, weakness, lightheadedness, and diaphoresis. Her symptoms did not improve with rest when she stopped walking. Her symptoms persisted for about 5 minutes while she walked home and did not resolve until she got home and lied down. She had no chest pain at any point. She has had no symptoms since that time including no chest pain. She has however had reduced appetite and nausea through that day. She was still tolerating foods PO and fluids but just had a reduced appetite. She does have a good appetite now. She did not have any caffeine prior to her presentation. She is active at her baseline and goes to the gym and goes on the elliptical. This presentation has been distinctly different than her past admissions for CAD. No history of thyroid disease. She has good follow up with cardiology and follows every 6 months. Her next appointment is in 6 days on Friday. Endorses ongoing left shoulder pain for the past month. She denies any urinary symptoms.         Hospital Course:    #KIRIT    Her on admission was 1.67. Baseline=0.74 in 2/2019. FENa 0.5% c/w pre-renal etiology in s/o possible poor PO intake with nausea and loss of appetite. Pt on chlorthalidone so urine urea was ordered but still pending at the time of discharge. She was s/p bolus of fluid in the ED and then was given 70 cc/hr NS overnight. We also held her chlorthalidone and lisinopril in the s/o of KIRIT. On hospital day 2 her Cr 1.67->1.05.         #Palpitations    She remained asymptomatic during her hospitalization. Her trops were negative. EKG was Normal sinus rhythm and unchanged from previous. CXR showed clear lungs. Given brief self resolving episode of palpitations most likely SVT vs a fib or other tachyarrhythmia. She was monitored on tele and had normal sinus rhythm with no events. Her TSH was 0.92.         #Hypertension    We held her chlorithalidone and lisinopril in s/o of KIRIT. Her BPs were in the 100s to 110s systolic without these meds and so sh was discharged on 10 lisinopril and no chlorithalidone. She has cardiolloogy follow up later this week.

## 2019-08-09 ENCOUNTER — APPOINTMENT (OUTPATIENT)
Dept: ORTHOPEDIC SURGERY | Facility: CLINIC | Age: 59
End: 2019-08-09
Payer: COMMERCIAL

## 2019-08-09 ENCOUNTER — APPOINTMENT (OUTPATIENT)
Dept: CARDIOLOGY | Facility: CLINIC | Age: 59
End: 2019-08-09
Payer: COMMERCIAL

## 2019-08-09 VITALS
HEIGHT: 60 IN | DIASTOLIC BLOOD PRESSURE: 77 MMHG | WEIGHT: 182 LBS | HEART RATE: 68 BPM | SYSTOLIC BLOOD PRESSURE: 136 MMHG | BODY MASS INDEX: 35.73 KG/M2

## 2019-08-09 VITALS
HEART RATE: 74 BPM | BODY MASS INDEX: 35.73 KG/M2 | DIASTOLIC BLOOD PRESSURE: 83 MMHG | WEIGHT: 182 LBS | SYSTOLIC BLOOD PRESSURE: 138 MMHG | OXYGEN SATURATION: 100 % | HEIGHT: 60 IN

## 2019-08-09 DIAGNOSIS — Z86.39 PERSONAL HISTORY OF OTHER ENDOCRINE, NUTRITIONAL AND METABOLIC DISEASE: ICD-10-CM

## 2019-08-09 PROCEDURE — 99204 OFFICE O/P NEW MOD 45 MIN: CPT | Mod: 25

## 2019-08-09 PROCEDURE — 20610 DRAIN/INJ JOINT/BURSA W/O US: CPT | Mod: LT

## 2019-08-09 PROCEDURE — 99214 OFFICE O/P EST MOD 30 MIN: CPT

## 2019-08-09 PROCEDURE — 73030 X-RAY EXAM OF SHOULDER: CPT | Mod: LT

## 2019-08-09 PROCEDURE — 93000 ELECTROCARDIOGRAM COMPLETE: CPT

## 2019-08-09 NOTE — HISTORY OF PRESENT ILLNESS
[de-identified] : Patient is a pleasant, 58-year-old right-hand dominant female with a history of coronary artery disease, and \par stent placement on Plavix comes in today complaining of Left greater than right shoulder pain.\par Patient reports having right shoulder pain for 6 months, which has overall been improving and her \par left shoulder is an 8/10 pain in getting worse with difficulty bending and extending.\par Her primary care doctor put her on ibuprofen despite being on Plavix and did not help.\par She would like an injection of cortisone today.\par Denies any radicular symptoms or recent injury\par No fever chills sweats nausea vomiting no bowel or bladder dysfunction\par no recent weight loss or gain no night pain. \par This history is in addition to the intake form that I personally reviewed.

## 2019-08-09 NOTE — PROCEDURE
[de-identified] : Procedure:Left shoulder injection Subacromial space\par Indication:  Inflammation. \par Risk, benefits and alternatives were discussed with the patient. \par Potential complications include bleeding, infection and allergic reaction. \par Verbal consent was obtained prior to the procedure. \par Alcohol and Betadine was used to prep the area. ethyl chloride spray was used as a topical anesthetic. \par A 22-gauge was used to inject 6 mL of 1% Lidocaine and 1 mL 40mg/mL methylprednisolone. \par A bandage was applied. \par The patient tolerated the procedure well and gain significant relief\par Complications: None. \par Patient instructed to avoid strenuous activity for 2 day(s). \par Follow-up in the office in 2 week(s).\par The patient expressed significant relief.

## 2019-08-09 NOTE — DISCUSSION/SUMMARY
[de-identified] : Acute left shoulder pain\par Patient opted for cortisone injection today-patient gain significant relief\par She will follow up with Dr. Sherwood in 2-4 weeks if no better\par Options discussed.\par All questions answered.\par The patient is in full agreement with the plan, \par and happy with the visit.\par --------------\par This note was done with a voice recognition transcription \par software and any typos are related to this rather than medical error.\par \par

## 2019-08-09 NOTE — PHYSICAL EXAM
[de-identified] : Left shoulder exam:\par Limited range of motion in all planes with pain\par Positive Akbar, positive empty can, positive Neer\par \par Neurologically intact distally and symmetrical\par otherwise, 5 out of 5 motor strength, sensation is intact and symmetrical full range of motion flexion extension and rotation, no palpatory tenderness full range of motion of hips knees shoulders and elbows (all four extremities), no atrophy, negative straight leg raise, no pathological reflexes, no swelling, normal ambulation, no apparent distress skin is intact, no palpable lymph nodes, no upper or lower extremity instability, alert and oriented x3 and normal mood. Normal finger-to nose test.  [de-identified] : Three-view of the left shoulder are negative for any obvious fracture or dislocation.  The patient understands that this is a wet read and I am not a radiologist.\par If the final read reveals something different than discussed in the office the patient will get a call back in the \par next 24-48 hours to discuss.

## 2019-08-15 ENCOUNTER — APPOINTMENT (OUTPATIENT)
Dept: CARDIOLOGY | Facility: CLINIC | Age: 59
End: 2019-08-15

## 2019-08-31 ENCOUNTER — NON-APPOINTMENT (OUTPATIENT)
Age: 59
End: 2019-08-31

## 2019-08-31 NOTE — HISTORY OF PRESENT ILLNESS
[FreeTextEntry1] : Rae returning for follow-up.\par No chest pains or tightness\par No palpitations \par \par

## 2019-08-31 NOTE — PHYSICAL EXAM
[General Appearance - Well Developed] : well developed [Well Groomed] : well groomed [Normal Appearance] : normal appearance [No Deformities] : no deformities [General Appearance - Well Nourished] : well nourished [General Appearance - In No Acute Distress] : no acute distress [Normal Conjunctiva] : the conjunctiva exhibited no abnormalities [Eyelids - No Xanthelasma] : the eyelids demonstrated no xanthelasmas [Normal Oral Mucosa] : normal oral mucosa [No Oral Pallor] : no oral pallor [No Oral Cyanosis] : no oral cyanosis [Normal Jugular Venous V Waves Present] : normal jugular venous V waves present [Normal Jugular Venous A Waves Present] : normal jugular venous A waves present [No Jugular Venous Nathan A Waves] : no jugular venous nathan A waves [Auscultation Breath Sounds / Voice Sounds] : lungs were clear to auscultation bilaterally [Respiration, Rhythm And Depth] : normal respiratory rhythm and effort [Exaggerated Use Of Accessory Muscles For Inspiration] : no accessory muscle use [Heart Sounds] : normal S1 and S2 [Heart Rate And Rhythm] : heart rate and rhythm were normal [Murmurs] : no murmurs present [Abdomen Soft] : soft [Abdomen Tenderness] : non-tender [Abnormal Walk] : normal gait [Abdomen Mass (___ Cm)] : no abdominal mass palpated [Nail Clubbing] : no clubbing of the fingernails [Gait - Sufficient For Exercise Testing] : the gait was sufficient for exercise testing [Petechial Hemorrhages (___cm)] : no petechial hemorrhages [Cyanosis, Localized] : no localized cyanosis [Skin Color & Pigmentation] : normal skin color and pigmentation [] : no rash [No Skin Ulcers] : no skin ulcer [Skin Lesions] : no skin lesions [No Venous Stasis] : no venous stasis [No Xanthoma] : no  xanthoma was observed [Oriented To Time, Place, And Person] : oriented to person, place, and time [Affect] : the affect was normal [Mood] : the mood was normal [No Anxiety] : not feeling anxious

## 2019-09-09 ENCOUNTER — TRANSCRIPTION ENCOUNTER (OUTPATIENT)
Age: 59
End: 2019-09-09

## 2019-12-13 ENCOUNTER — RX RENEWAL (OUTPATIENT)
Age: 59
End: 2019-12-13

## 2020-09-15 ENCOUNTER — APPOINTMENT (OUTPATIENT)
Dept: ORTHOPEDIC SURGERY | Facility: CLINIC | Age: 60
End: 2020-09-15
Payer: COMMERCIAL

## 2020-09-15 VITALS — TEMPERATURE: 98.3 F

## 2020-09-15 VITALS
DIASTOLIC BLOOD PRESSURE: 78 MMHG | HEART RATE: 67 BPM | BODY MASS INDEX: 35.73 KG/M2 | WEIGHT: 182 LBS | SYSTOLIC BLOOD PRESSURE: 172 MMHG | HEIGHT: 60 IN

## 2020-09-15 DIAGNOSIS — M25.512 PAIN IN LEFT SHOULDER: ICD-10-CM

## 2020-09-15 PROCEDURE — 73030 X-RAY EXAM OF SHOULDER: CPT | Mod: LT

## 2020-09-15 PROCEDURE — 99213 OFFICE O/P EST LOW 20 MIN: CPT

## 2020-09-17 ENCOUNTER — NON-APPOINTMENT (OUTPATIENT)
Age: 60
End: 2020-09-17

## 2020-09-17 ENCOUNTER — APPOINTMENT (OUTPATIENT)
Dept: CARDIOLOGY | Facility: CLINIC | Age: 60
End: 2020-09-17
Payer: COMMERCIAL

## 2020-09-17 VITALS — SYSTOLIC BLOOD PRESSURE: 166 MMHG | OXYGEN SATURATION: 100 % | HEART RATE: 77 BPM | DIASTOLIC BLOOD PRESSURE: 93 MMHG

## 2020-09-17 PROCEDURE — 99213 OFFICE O/P EST LOW 20 MIN: CPT

## 2020-09-17 PROCEDURE — 93000 ELECTROCARDIOGRAM COMPLETE: CPT

## 2020-09-19 NOTE — DISCUSSION/SUMMARY
[FreeTextEntry1] : Cont with plavix due to bifurcation stents.\par Restart diuretic due to severe HTN\par Stress test to eval for restenosis of bifurcation stents.\par F/u 6 mo

## 2020-09-19 NOTE — HISTORY OF PRESENT ILLNESS
[FreeTextEntry1] : Rae returning for follow-up.\par No chest pains or tightness\par No palpitations \par BP remains high\par

## 2020-10-09 ENCOUNTER — OUTPATIENT (OUTPATIENT)
Dept: OUTPATIENT SERVICES | Facility: HOSPITAL | Age: 60
LOS: 1 days | End: 2020-10-09
Payer: COMMERCIAL

## 2020-10-09 ENCOUNTER — APPOINTMENT (OUTPATIENT)
Dept: CV DIAGNOSTICS | Facility: HOSPITAL | Age: 60
End: 2020-10-09

## 2020-10-09 DIAGNOSIS — I25.10 ATHEROSCLEROTIC HEART DISEASE OF NATIVE CORONARY ARTERY WITHOUT ANGINA PECTORIS: Chronic | ICD-10-CM

## 2020-10-09 DIAGNOSIS — I25.10 ATHEROSCLEROTIC HEART DISEASE OF NATIVE CORONARY ARTERY WITHOUT ANGINA PECTORIS: ICD-10-CM

## 2020-10-09 PROCEDURE — 78452 HT MUSCLE IMAGE SPECT MULT: CPT | Mod: 26

## 2020-10-09 PROCEDURE — 78452 HT MUSCLE IMAGE SPECT MULT: CPT

## 2020-10-09 PROCEDURE — 93018 CV STRESS TEST I&R ONLY: CPT

## 2020-10-09 PROCEDURE — A9500: CPT

## 2020-10-09 PROCEDURE — 93017 CV STRESS TEST TRACING ONLY: CPT

## 2020-10-09 PROCEDURE — 93016 CV STRESS TEST SUPVJ ONLY: CPT

## 2020-10-12 RX ORDER — CHLORTHALIDONE 25 MG/1
25 TABLET ORAL DAILY
Qty: 90 | Refills: 3 | Status: DISCONTINUED | COMMUNITY
Start: 2019-02-14 | End: 2020-10-12

## 2020-11-04 PROBLEM — M25.512 ACUTE PAIN OF LEFT SHOULDER: Status: ACTIVE | Noted: 2019-08-12

## 2020-11-04 NOTE — PHYSICAL EXAM
[de-identified] : Oriented to time, place, person\par Mood: Normal\par Affect: Normal\par Appearance: Healthy, well appearing, no acute distress.\par Gait: Normal\par Assistive Devices: None\par \par Left shoulder exam\par \par Inspection: No malalignment, No defects, No atrophy\par Skin: No masses, No lesions\par Neck: Negative Spurlings, full ROM, no pain with ROM\par AROM: FF to 165, abduction to 85, ER to 50, IR to mid lumbar\par Painful arc ROM: Pain with terminal motion\par Tenderness: No bicipital tenderness, positive tenderness to the greater tuberosity/RTC insertion, no anterior shoulder/lesser tuberosity tenderness\par Strength: 5/5 ER, 5/5 IR in adduction, 5/5 supraspinatus testing, [default value] O'Briens test\par AC Joint: No ttp/pain with cross arm testing\par Biceps: Speed Negative, Yergusons Negative\par Impingement test: Positive Akbar, Positive Neer \par Stability: Negative apprehension, negative anterior/posterior load and shift\par Vasc: 2+ radial pulse\par Neuro: AIN, PIN, Ulnar nerve in tact to motor\par Sensation: In tact to light touch throughout\par  [de-identified] : Images were reviewed from [facility] dated 8.9.19. \par \par Multiple views left shoulder showed no evidence of bony injury, or lili dislocation. There is no underlying degenerative arthritic change seen. Overall alignment is maintained. Otherwise unremarkable.

## 2020-11-04 NOTE — HISTORY OF PRESENT ILLNESS
[de-identified] : 59 year old RHD female presents today with acute on chronic left shoulder pain x 1 month. No injury reported. She was evaluated by out after hours team last year, received a cortisone injection which gave her complete relief of pain until recently. The pain is constant worse with internal rotation and overhead reaching. Tylenol provides temporary relief. Denies numbness or tingling. \par \par The patient's past medical history, past surgical history, medications and allergies were reviewed by me today with the patient and documented accordingly. In addition, the patient's family and social history, which were noncontributory to this visit, were reviewed also.

## 2020-11-04 NOTE — DISCUSSION/SUMMARY
[de-identified] : 60-year-old female with left shoulder pain\par \par A discussion was had with the patient regarding potential sources of inflammatory shoulder discomfort; including rotator cuff tendon dysfunction (including tendonitis vs. internal structural damage), subdeltoid/subacromial bursitis, or impingement of the rotator cuff at the acromion. Other contributing sources of pain may be the acromioclavicular joint or long head of the biceps tendon. Irritation is typically caused either by overhead repetitive activity or as a result of minor injury.\par \par Discussed short-term and long-term outcomes as well as the goal of treatment to reduce pain and restore function. Nonsurgical treatment is typically first-line therapy that may take weeks to months to resolve symptoms; includes rest from overhead activities, NSAIDs, home exercise program versus physical therapy to restore normal strength/ROM/function of the shoulder, and possible corticosteroid injection. Also discussed role of arthroscopic surgical intervention when nonsurgical treatment does not adequately relieve pain/inflammation.\par \par Recommendations: Conservative modalities as above (overhead activity rest/activity avoidance until less symptommatic, ice, NSAIDs if tolerated, home exercise strengthening and stretching program). \par \par Physical therapy Rx given for shoulder/RTC strengthening and conditioning program\par \par Followup: 6-8wks as needed if symptoms persist or worsen.

## 2020-11-20 ENCOUNTER — RX RENEWAL (OUTPATIENT)
Age: 60
End: 2020-11-20

## 2020-11-27 ENCOUNTER — RX RENEWAL (OUTPATIENT)
Age: 60
End: 2020-11-27

## 2020-12-28 ENCOUNTER — RX RENEWAL (OUTPATIENT)
Age: 60
End: 2020-12-28

## 2021-06-08 ENCOUNTER — EMERGENCY (EMERGENCY)
Facility: HOSPITAL | Age: 61
LOS: 1 days | Discharge: HOME | End: 2021-06-08
Payer: COMMERCIAL

## 2021-06-08 VITALS
SYSTOLIC BLOOD PRESSURE: 108 MMHG | HEART RATE: 55 BPM | OXYGEN SATURATION: 100 % | TEMPERATURE: 98 F | RESPIRATION RATE: 16 BRPM | DIASTOLIC BLOOD PRESSURE: 68 MMHG

## 2021-06-08 VITALS
HEIGHT: 60 IN | OXYGEN SATURATION: 100 % | RESPIRATION RATE: 18 BRPM | SYSTOLIC BLOOD PRESSURE: 159 MMHG | WEIGHT: 190.04 LBS | DIASTOLIC BLOOD PRESSURE: 90 MMHG | TEMPERATURE: 98 F | HEART RATE: 87 BPM

## 2021-06-08 DIAGNOSIS — I25.10 ATHEROSCLEROTIC HEART DISEASE OF NATIVE CORONARY ARTERY WITHOUT ANGINA PECTORIS: Chronic | ICD-10-CM

## 2021-06-08 LAB
ALBUMIN SERPL ELPH-MCNC: 4.4 G/DL — SIGNIFICANT CHANGE UP (ref 3.3–5)
ALP SERPL-CCNC: 246 U/L — HIGH (ref 40–120)
ALT FLD-CCNC: 15 U/L — SIGNIFICANT CHANGE UP (ref 10–45)
ANION GAP SERPL CALC-SCNC: 14 MMOL/L — SIGNIFICANT CHANGE UP (ref 5–17)
AST SERPL-CCNC: 19 U/L — SIGNIFICANT CHANGE UP (ref 10–40)
BASOPHILS # BLD AUTO: 0.03 K/UL — SIGNIFICANT CHANGE UP (ref 0–0.2)
BASOPHILS NFR BLD AUTO: 0.4 % — SIGNIFICANT CHANGE UP (ref 0–2)
BILIRUB SERPL-MCNC: 0.4 MG/DL — SIGNIFICANT CHANGE UP (ref 0.2–1.2)
BUN SERPL-MCNC: 15 MG/DL — SIGNIFICANT CHANGE UP (ref 7–23)
CALCIUM SERPL-MCNC: 10.4 MG/DL — SIGNIFICANT CHANGE UP (ref 8.4–10.5)
CHLORIDE SERPL-SCNC: 99 MMOL/L — SIGNIFICANT CHANGE UP (ref 96–108)
CO2 SERPL-SCNC: 25 MMOL/L — SIGNIFICANT CHANGE UP (ref 22–31)
CREAT SERPL-MCNC: 0.88 MG/DL — SIGNIFICANT CHANGE UP (ref 0.5–1.3)
EOSINOPHIL # BLD AUTO: 0.11 K/UL — SIGNIFICANT CHANGE UP (ref 0–0.5)
EOSINOPHIL NFR BLD AUTO: 1.3 % — SIGNIFICANT CHANGE UP (ref 0–6)
GLUCOSE SERPL-MCNC: 87 MG/DL — SIGNIFICANT CHANGE UP (ref 70–99)
HCT VFR BLD CALC: 37.9 % — SIGNIFICANT CHANGE UP (ref 34.5–45)
HGB BLD-MCNC: 12.4 G/DL — SIGNIFICANT CHANGE UP (ref 11.5–15.5)
IMM GRANULOCYTES NFR BLD AUTO: 0.2 % — SIGNIFICANT CHANGE UP (ref 0–1.5)
LYMPHOCYTES # BLD AUTO: 3.79 K/UL — HIGH (ref 1–3.3)
LYMPHOCYTES # BLD AUTO: 44.6 % — HIGH (ref 13–44)
MCHC RBC-ENTMCNC: 27.9 PG — SIGNIFICANT CHANGE UP (ref 27–34)
MCHC RBC-ENTMCNC: 32.7 GM/DL — SIGNIFICANT CHANGE UP (ref 32–36)
MCV RBC AUTO: 85.2 FL — SIGNIFICANT CHANGE UP (ref 80–100)
MONOCYTES # BLD AUTO: 0.71 K/UL — SIGNIFICANT CHANGE UP (ref 0–0.9)
MONOCYTES NFR BLD AUTO: 8.4 % — SIGNIFICANT CHANGE UP (ref 2–14)
NEUTROPHILS # BLD AUTO: 3.83 K/UL — SIGNIFICANT CHANGE UP (ref 1.8–7.4)
NEUTROPHILS NFR BLD AUTO: 45.1 % — SIGNIFICANT CHANGE UP (ref 43–77)
NRBC # BLD: 0 /100 WBCS — SIGNIFICANT CHANGE UP (ref 0–0)
PLATELET # BLD AUTO: 442 K/UL — HIGH (ref 150–400)
POTASSIUM SERPL-MCNC: 3.5 MMOL/L — SIGNIFICANT CHANGE UP (ref 3.5–5.3)
POTASSIUM SERPL-SCNC: 3.5 MMOL/L — SIGNIFICANT CHANGE UP (ref 3.5–5.3)
PROT SERPL-MCNC: 8.5 G/DL — HIGH (ref 6–8.3)
RBC # BLD: 4.45 M/UL — SIGNIFICANT CHANGE UP (ref 3.8–5.2)
RBC # FLD: 14.4 % — SIGNIFICANT CHANGE UP (ref 10.3–14.5)
SODIUM SERPL-SCNC: 138 MMOL/L — SIGNIFICANT CHANGE UP (ref 135–145)
TROPONIN T, HIGH SENSITIVITY RESULT: 6 NG/L — SIGNIFICANT CHANGE UP (ref 0–51)
TROPONIN T, HIGH SENSITIVITY RESULT: <6 NG/L — SIGNIFICANT CHANGE UP (ref 0–51)
WBC # BLD: 8.49 K/UL — SIGNIFICANT CHANGE UP (ref 3.8–10.5)
WBC # FLD AUTO: 8.49 K/UL — SIGNIFICANT CHANGE UP (ref 3.8–10.5)

## 2021-06-08 PROCEDURE — 71045 X-RAY EXAM CHEST 1 VIEW: CPT

## 2021-06-08 PROCEDURE — 80053 COMPREHEN METABOLIC PANEL: CPT

## 2021-06-08 PROCEDURE — 85025 COMPLETE CBC W/AUTO DIFF WBC: CPT

## 2021-06-08 PROCEDURE — 71045 X-RAY EXAM CHEST 1 VIEW: CPT | Mod: 26

## 2021-06-08 PROCEDURE — 99284 EMERGENCY DEPT VISIT MOD MDM: CPT | Mod: 25

## 2021-06-08 PROCEDURE — 99285 EMERGENCY DEPT VISIT HI MDM: CPT

## 2021-06-08 PROCEDURE — 93010 ELECTROCARDIOGRAM REPORT: CPT

## 2021-06-08 PROCEDURE — 93005 ELECTROCARDIOGRAM TRACING: CPT | Mod: 76

## 2021-06-08 PROCEDURE — 84484 ASSAY OF TROPONIN QUANT: CPT

## 2021-06-08 NOTE — ED PROVIDER NOTE - NSFOLLOWUPINSTRUCTIONS_ED_ALL_ED_FT
There are no signs of emergency conditions requiring admission to the hospital on today's workup.  Based on the evaluation, a presumptive diagnosis was made, however, further evaluation may be required by your primary care physician or a specialist for a more definitive diagnosis.  Therefore, please follow-up as directed or return to the Emergency Department if your symptoms change or worsen.    We recommend that you:  1. See your primary care physician within the next 72 hours for follow up.  Bring a copy of your discharge paperwork (including any test results) to your doctor.  2. Please see your cardiologist Dr. Deleon within 48 hours for follow up of your chest pain.  3. Please maintain healthy diet and stay hydrated.   4. Please take Tylenol 500mg every 6-8 hours as needed for pain.         *** Return immediately if you have worsening symptoms, chest pain, Shortness of Breath, abdominal pain, Nausea/Vomiting/Diarrhea, dizziness, weakness, confusion, vision changes, urinary symptoms, syncope, falls, trauma, discharge, bleeding, fevers , or any other new/concerning symptoms. ***

## 2021-06-08 NOTE — ED PROVIDER NOTE - ATTENDING CONTRIBUTION TO CARE
MD Fuller:  patient seen and evaluated with the resident.  I was present for key portions of the History & Physical, and I agree with the Impression & Plan.  MD Fuller: MD Fuller:  patient seen and evaluated with the PA.  I was present for key portions of the History & Physical, and I agree with the Impression & Plan.  MD Fuller:  59 yo F, c/o chest pain.  Duration:  2 episodes in 5days; each episode was 2-3 minutes in duration  Quality:  sharp  Associated Sx:  no SOB, No back pain.  No weakness/numbness, no abdominal pain, & no fever/chills.    VS: wnl.  Physical Exam: adult F, NAD, NCAT, PERRL, EOMI, neck supple, CTA B, RRR, Abd: s/nd/nt, Ext: no edema.  Neuro:  AAOx3, moving all 4 extremities equally, normal gait.     ECG:  RBBB, no interval change from prior.  Impression:  Adult F with known CAD, and intermittent sharp CP that warrants consideration for ACS.  However, her ECG is unchanged from prior & first troponin is******    H&P at this time inconsistent with an alternative etiology such as pericarditis, myocarditis, pulmonary embolism, pneumothorax, pneumonia, zoster, or esophageal perforation.  History not abrupt in onset, tearing or ripping; pulses symmetric; no evidence of aortic dissection.  Neither CTA chest, nor ddimer indicated at this time. MD Fuller:  patient seen and evaluated with the PA.  I was present for key portions of the History & Physical, and I agree with the Impression & Plan.  MD Fuller:  61 yo F, c/o chest pain.  Duration:  2 episodes in 5days; each episode was 2-3 minutes in duration  Quality:  sharp  Associated Sx:  no SOB, No back pain.  No weakness/numbness, no abdominal pain, & no fever/chills.    VS: wnl.  Physical Exam: adult F, NAD, NCAT, PERRL, EOMI, neck supple, CTA B, RRR, Abd: s/nd/nt, Ext: no edema.  Neuro:  AAOx3, moving all 4 extremities equally, normal gait.     ECG:  RBBB, no interval change from prior.  Impression:  Adult F with known CAD, and intermittent sharp CP that warrants consideration for ACS in the ED.  However, her ECG is unchanged from prior & troponin x2 are unremarkable.     H&P at this time inconsistent with an alternative etiology such as pericarditis, myocarditis, pulmonary embolism, pneumothorax, pneumonia, zoster, or esophageal perforation.  History not abrupt in onset, tearing or ripping; pulses symmetric; no evidence of aortic dissection.  Neither CTA chest, nor ddimer indicated at this time.  Plan:  given stable ECG, unremarkable troponin, and excellent outpatient follow-up, the patient may be discharged from the ED at this time.  I have spoken with the patient extensively regarding current differential diagnosis for ongoing symptoms, and patient acknowledged understanding. All questions and concerns have been addressed with the patient. I have discussed the plan for care and patient is in agreement. Patient is instructed to follow up with Dr. Leonard, and has been given strict return precautions.

## 2021-06-08 NOTE — ED PROVIDER NOTE - PATIENT PORTAL LINK FT
You can access the FollowMyHealth Patient Portal offered by [facility] by registering at the following website: [email or web address]. By joining [facility]’s FollowMyHealth portal, you will also be able to view your health information using other applications (apps) compatible with our system.

## 2021-06-08 NOTE — ED PROVIDER NOTE - OBJECTIVE STATEMENT
60 year old fm with pmhx of HTN, HLD, CAD presents to the ED with reoccurring episode of  point tenderness CP to the Right side of chest that started at approx 1 PM that waxes and wanes and sharp in nature. patient also reports some baseline mid chest tightness that is baseline in nature. patient reports had a episode last week with similar symptoms that patient reports lasted "a few seconds" patient reports pain started without any provoking factors. patient denies taking anything for it. patient denies palpitations, Shortness of Breath, abdominal pain, Nausea/Vomiting/Diarrhea, dizziness, weakness, confusion, vision changes, urinary symptoms, syncope, falls, trauma, discharge, bleeding, fevers. patient reports currently no pain. 60 year old fm with pmhx of HTN, HLD, CAD presents to the ED with reoccurring episode of  point tenderness CP to the Right side of chest that started at approx 1 PM that waxes and wanes and sharp in nature. patient also reports some baseline mid chest tightness that is baseline in nature. patient reports had a episode last week with similar symptoms that patient reports lasted "a few seconds" patient reports pain started without any provoking factors. patient denies taking anything for it. patient denies palpitations, Shortness of Breath, abdominal pain, Nausea/Vomiting/Diarrhea, dizziness, weakness, confusion, vision changes, urinary symptoms, syncope, falls, trauma, discharge, bleeding, fevers. patient reports currently no pain. patient admits to anticoagulation therapy

## 2021-06-08 NOTE — ED PROVIDER NOTE - NS ED ROS FT
Review of Systems:  · Constitutional: no chills, no fever, no night sweats, no weight loss  · Nose: no epistaxis  · Mouth/Throat: no difficulty in swallowing, trachea midline, uvula midline  . Cardio: CP, no palpitations, no chest pressure, no ripping chest pain  · Respiratory: no cough, no exertional dyspnea, no hemoptysis, no orthopnea, no shortness of breath  · Gastrointestinal: no abdominal pain, no diarrhea, no melena, no nausea, no vomiting  · Genitourinary: no difficulty urinating, no dysuria, no hematuria  · MUSCULOSKELETAL: FROM of all extremities  · Skin: no abrasion; no bruising; no laceration  · Neurological: no change in level of consciousness, no headache, no seizures  · ROS STATEMENT: all other ROS negative except as per HPI

## 2021-06-08 NOTE — ED ADULT NURSE REASSESSMENT NOTE - NS ED NURSE REASSESS COMMENT FT1
RN AT break coverage: Rounding on patient complete, no complaints at this time. In no distress. Cardiac monitor in place, sating well on RA. Does not appear to be in any acute distress. PIV patent and flushing w/o difficulty, no s/s infection/infiltration. All needs met. Updated on plan of care.

## 2021-06-08 NOTE — ED PROVIDER NOTE - CLINICAL SUMMARY MEDICAL DECISION MAKING FREE TEXT BOX
Impression:  Adult F with known CAD, and intermittent sharp CP that warrants consideration for ACS in the ED.  However, her ECG is unchanged from prior & troponin x2 are unremarkable.     H&P at this time inconsistent with an alternative etiology such as pericarditis, myocarditis, pulmonary embolism, pneumothorax, pneumonia, zoster, or esophageal perforation.  History not abrupt in onset, tearing or ripping; pulses symmetric; no evidence of aortic dissection.  Neither CTA chest, nor ddimer indicated at this time.  Plan:  given stable ECG, unremarkable troponin, and excellent outpatient follow-up, the patient may be discharged from the ED at this time.  I have spoken with the patient extensively regarding current differential diagnosis for ongoing symptoms, and patient acknowledged understanding. All questions and concerns have been addressed with the patient. I have discussed the plan for care and patient is in agreement. Patient is instructed to follow up with Dr. Leonard, and has been given strict return precautions.

## 2021-06-08 NOTE — ED PROVIDER NOTE - PHYSICAL EXAMINATION
On Physical Exam:  General: well appearing, in NAD, speaking clearly in full sentences and without difficulty; cooperative with exam  HEENT: PERRL, MMM  Neck: no neck tenderness, no nuchal rigidity  Cardiac: normal s1, s2; RRR; no MGR  Chest: R sided tenderness to palpation, CTABL  Abdomen: soft nontender/nondistended  : no bladder tenderness or distension  Skin: warm, intact, no rash  Extremities: no peripheral edema, no gross deformities  Neuro: no gross neurologic deficits

## 2021-06-08 NOTE — ED ADULT NURSE NOTE - OBJECTIVE STATEMENT
59 y/o female pmh HTN, HLD, CAD- with 2 cardiac stents back in 2015 presenting to ED c/o mid sternal non radiating cp and discomfort associated with intermittent HANSON x this am. pt describes the pain as being located in the middle of her chest, describes the discomfort as being mildly worse upon inspiration, denies anything making the cp worse or better, did not take anything for the pain or discomfort prior to arrival. states that her last stress test was in october 2020 and was normal with no acute or concerning findings. denies any recent cough, fever, travel, sick contacts or numbness or tingling. VS stable upon assessment. EKG completed upon triage arrival. labs and line obtained, results pending. pt placed on continuous cardiac monitor. safety maintained, call bell at the bedside and within reach.

## 2021-06-11 ENCOUNTER — RESULT REVIEW (OUTPATIENT)
Age: 61
End: 2021-06-11

## 2021-06-17 ENCOUNTER — APPOINTMENT (OUTPATIENT)
Dept: CARDIOLOGY | Facility: CLINIC | Age: 61
End: 2021-06-17
Payer: COMMERCIAL

## 2021-06-17 VITALS
HEIGHT: 60 IN | OXYGEN SATURATION: 100 % | HEART RATE: 72 BPM | WEIGHT: 182 LBS | SYSTOLIC BLOOD PRESSURE: 131 MMHG | DIASTOLIC BLOOD PRESSURE: 82 MMHG | BODY MASS INDEX: 35.73 KG/M2

## 2021-06-17 PROCEDURE — 99213 OFFICE O/P EST LOW 20 MIN: CPT

## 2021-06-17 PROCEDURE — 93000 ELECTROCARDIOGRAM COMPLETE: CPT

## 2021-07-07 ENCOUNTER — NON-APPOINTMENT (OUTPATIENT)
Age: 61
End: 2021-07-07

## 2021-07-07 NOTE — HISTORY OF PRESENT ILLNESS
[FreeTextEntry1] : Rae returning for follow-up.\par Recent hospitalization reviewed\par Symptoms that lead to ER visit have resolved\par No chest pains or tightness\par No palpitations \par \par

## 2021-11-01 ENCOUNTER — RX RENEWAL (OUTPATIENT)
Age: 61
End: 2021-11-01

## 2021-11-08 ENCOUNTER — RX RENEWAL (OUTPATIENT)
Age: 61
End: 2021-11-08

## 2022-02-08 ENCOUNTER — RX RENEWAL (OUTPATIENT)
Age: 62
End: 2022-02-08

## 2022-03-09 ENCOUNTER — NON-APPOINTMENT (OUTPATIENT)
Age: 62
End: 2022-03-09

## 2022-03-09 ENCOUNTER — APPOINTMENT (OUTPATIENT)
Dept: CARDIOLOGY | Facility: CLINIC | Age: 62
End: 2022-03-09
Payer: COMMERCIAL

## 2022-03-09 ENCOUNTER — APPOINTMENT (OUTPATIENT)
Dept: HEPATOLOGY | Facility: CLINIC | Age: 62
End: 2022-03-09
Payer: COMMERCIAL

## 2022-03-09 VITALS
SYSTOLIC BLOOD PRESSURE: 132 MMHG | DIASTOLIC BLOOD PRESSURE: 80 MMHG | HEART RATE: 80 BPM | HEIGHT: 60 IN | RESPIRATION RATE: 16 BRPM | OXYGEN SATURATION: 99 % | WEIGHT: 184 LBS | BODY MASS INDEX: 36.12 KG/M2 | TEMPERATURE: 97.3 F

## 2022-03-09 VITALS
WEIGHT: 184 LBS | HEART RATE: 84 BPM | HEIGHT: 60 IN | BODY MASS INDEX: 36.12 KG/M2 | SYSTOLIC BLOOD PRESSURE: 115 MMHG | OXYGEN SATURATION: 100 % | DIASTOLIC BLOOD PRESSURE: 72 MMHG

## 2022-03-09 PROCEDURE — 93000 ELECTROCARDIOGRAM COMPLETE: CPT

## 2022-03-09 PROCEDURE — 99215 OFFICE O/P EST HI 40 MIN: CPT

## 2022-03-09 PROCEDURE — 99214 OFFICE O/P EST MOD 30 MIN: CPT

## 2022-03-09 NOTE — ASSESSMENT
[FreeTextEntry1] : This is a 61-year-old female with past medical history of coronary artery disease, status post PCI with stent on Plavix since 2015, dyslipidemia on simvastatin, hypertension on lisinopril and hydrochlorothiazide presents for further evaluation for isolated persistent elevation of alkaline phosphatase level at least since 2015.  She is otherwise asymptomatic.  Prior evaluation with [facility] fibrorevealed S2, F2 0.  Alkaline phosphatase level fractionation revealed primary component from bone (?).  Prior acute viral hepatitis panel including hepatitis A, hepatitis B, hepatitis C were negative.\par \par Plan\par #1. Elevated alkaline phosphatase level\par Unclear etiology although patient did have suggestion for hepatic steatosis based on [facility]I fibro-.  However prior ultrasound of the abdomen did not reveal fatty liver.  Unclear etiology for elevated alkaline phosphatase level.  I will consider having a comprehensive evaluation to rule out any underlying chronic liver disease with particular interest to rule out PBC, PSC, autoimmune cholangitis, drug-induced liver injury, sarcoidosis in the differential diagnosis.  Potential etiology related to bone metabolism also remains in the differential diagnosis considering alkaline phosphatase level fractionation suggesting potential origin from bone.\par \par I will send a comprehensive evaluation to elucidate etiology of his liver disease . Tests today to include a CBC, CMP, PT/INR, autoimmune markers including JOSH, smooth muscle antibody, mitochondrial antibody, SLA, evaluation for Rashaun's disease with ceruloplasmin. We will also send tests for MENA-D (Lysosomal Acid Lipase Lipase Deficiency). Additionally we'll send screening test for hemochromatosis with serum iron, TIBC, ferritin, as well as test for alpha-1 antitrypsin deficiency. We will also send tests for viral serology including:  hepatitis B surface antibody, hepatitis B core IgG antibody, hepatitis A IgG antibody. \par We will also check Transglutaminase IgG antibody.\par \par In addition we will send on routine blood test including CBC, CMP, PT/INR, GGT level.\par \par Schedule a FibroScan of the liver to assess liver fibrosis.\par \par I am also going to schedule an MRI with [facility]CP.  I am also going to request MR elastography and MR PDFF of sequences.\par \par If etiological work-up is unrevealing you may consider a liver biopsy on follow-up visit.\par \par #2.  Obesity with a BMI of 35.94 kg/m²\par Morbid obesity- I have discussed with him at length regarding his problems with obesity.  have discussed with him that lifestyle modifications are crucial for management of his obesity. I recommended gradual weight loss of 5-10% of his body weight through dietary changes and moderate intensity exercise for 20 minutes at least 3 times per week as tolerated. I have also suggested him to consult a nutritionist to help him loose weight more physiologically.\par \par \par Return to hepatology clinic in about 1 month.\par \par \par \par

## 2022-03-09 NOTE — HISTORY OF PRESENT ILLNESS
[Fatigue] : denies fatigue [Malaise] : denies malaise [Fever] : denies fever [Diffuse Joint Pain (Arthralgias)] : denies arthralgias [Muscle Aches, Generalized (Myalgias)] : denies myalgias [Yellow Skin Or Eyes (Jaundice)] : denies jaundice [Abdominal Pain] : denies abdominal pain [Light Colored Bowel Movement (Acholic Stools)] : denies pale stools [Urine Tests Nonspecific Abnormal Findings Biliuria] : denies dark urine [Insomnia] : denies insomnia [Skin: Rash] : denies rash [Itching (Pruritus)] : denies pruritus [Good Compliance] : good compliance with treatment [Needlestick Exposure] : no needlestick exposure [IV Drug Use] : no IV drug use [Tattoo] : no tattoos [FreeTextEntry1] : Ms. ELLEN COWART a 61 year Black or  Tish female  presents today for  a hepatology appointment. She has been a patient  PAM ENRIQUEZ.\par \par This is a 61-year-old female with past medical history of coronary artery disease (currently on Plavix), status post PCI with stent, dyslipidemia (on simvastatin 20 mg daily), hypertension (on hydrochlorothiazide 12.5 mg daily and lisinopril 40 mg daily) presents for further evaluation for isolated elevation of alkaline phosphatase since 2015.  She denies any cholestatic symptoms such as pruritus or jaundice.  She denies any nausea, vomiting, abdominal pain, fever, chills, rigor.\par \par She has been following with Simi NguyễnPA in gastroenterologist) at [facility] in 2020.  She had a CHOI fibro test which revealed S2, F0. \par \par An ultrasound of the abdomen from 2019 revealed normal-appearing liver and bile ducts.\par \par Her blood test results from 8 June 2021 revealed a white cell count of 8.49, hemoglobin 12.4 g/dL, platelet count 442,000.  Liver test revealed total bilirubin 0.4 mg/dL, AST 19, ALT 15, alkaline phosphatase 246 units/L.  Serum creatinine was 0.88 mg/dL.\par

## 2022-03-09 NOTE — REVIEW OF SYSTEMS
[Fever] : no fever [Chills] : no chills [Eyesight Problems] : no eyesight problems [Discharge From Eyes] : no purulent discharge from the eyes [Nosebleeds] : no nosebleeds [Nasal Discharge] : no nasal discharge [Chest Pain] : no chest pain [Palpitations] : no palpitations [Cough] : no cough [SOB on Exertion] : no shortness of breath during exertion [Vomiting] : no vomiting [Constipation] : no constipation [Diarrhea] : no diarrhea [Limb Pain] : no limb pain [Limb Swelling] : no limb swelling [Muscle Weakness] : no muscle weakness [Deepening Of The Voice] : no deepening of the voice

## 2022-03-09 NOTE — PHYSICAL EXAM
[General Appearance - Alert] : alert [General Appearance - In No Acute Distress] : in no acute distress [Sclera] : the sclera and conjunctiva were normal [Outer Ear] : the ears and nose were normal in appearance [Hearing Threshold Finger Rub Not [place]] : hearing was normal [Neck Appearance] : the appearance of the neck was normal [Exaggerated Use Of Accessory Muscles For Inspiration] : no accessory muscle use [Abdomen Soft] : soft [Abdomen Tenderness] : non-tender [No CVA Tenderness] : no ~M costovertebral angle tenderness [No Spinal Tenderness] : no spinal tenderness [Skin Color & Pigmentation] : normal skin color and pigmentation [] : no rash [Heart Rate And Rhythm] : heart rate was normal and rhythm regular [Heart Sounds] : normal S1 and S2 [Heart Sounds Gallop] : no gallops [Murmurs] : no murmurs [Heart Sounds Pericardial Friction Rub] : no pericardial rub [Abnormal Walk] : normal gait [Nail Clubbing] : no clubbing  or cyanosis of the fingernails [Musculoskeletal - Swelling] : no joint swelling seen [Motor Tone] : muscle strength and tone were normal [Deep Tendon Reflexes (DTR)] : deep tendon reflexes were 2+ and symmetric [Sensation] : the sensory exam was normal to light touch and pinprick [No Focal Deficits] : no focal deficits [Oriented To Time, Place, And Person] : oriented to person, place, and time [Impaired Insight] : insight and judgment were intact [Affect] : the affect was normal [Scleral Icterus] : No Scleral Icterus [Asterixis] : no asterixis observed [Jaundice] : No jaundice

## 2022-03-10 NOTE — HISTORY OF PRESENT ILLNESS
[FreeTextEntry1] : Rae returning for follow-up. \par No chest pains or tightness\par No palpitations \par trying to loose weight\par \par

## 2022-03-10 NOTE — CARDIOLOGY SUMMARY
[de-identified] : 3/9/22\par Sinus  Rhythm \par -Right bundle branch block with left axis -bifascicular block. \par \par ABNORMAL \par

## 2022-03-10 NOTE — DISCUSSION/SUMMARY
[FreeTextEntry1] : Cont with plavix due to bifurcation stents.\par c/w current meds for hchol and HTN\par \par F/u 6 mo

## 2022-03-14 LAB
A1AT PHENOTYP SERPL-IMP: NORMAL
A1AT SERPL-MCNC: 140 MG/DL
A1AT SERPL-MCNC: 144 MG/DL
ALBUMIN SERPL ELPH-MCNC: 4.9 G/DL
ALP BLD-CCNC: 232 U/L
ALT SERPL-CCNC: 19 U/L
ANA SER IF-ACNC: NEGATIVE
ANION GAP SERPL CALC-SCNC: 14 MMOL/L
AST SERPL-CCNC: 22 U/L
BASOPHILS # BLD AUTO: 0.05 K/UL
BASOPHILS NFR BLD AUTO: 0.8 %
BILIRUB SERPL-MCNC: 0.4 MG/DL
BUN SERPL-MCNC: 20 MG/DL
CALCIUM SERPL-MCNC: 10.7 MG/DL
CERULOPLASMIN SERPL-MCNC: 45 MG/DL
CHLORIDE SERPL-SCNC: 99 MMOL/L
CO2 SERPL-SCNC: 28 MMOL/L
CREAT SERPL-MCNC: 1.11 MG/DL
EGFR: 56 ML/MIN/1.73M2
EOSINOPHIL # BLD AUTO: 0.08 K/UL
EOSINOPHIL NFR BLD AUTO: 1.3 %
FERRITIN SERPL-MCNC: 148 NG/ML
GGT SERPL-CCNC: 20 U/L
GLUCOSE SERPL-MCNC: 106 MG/DL
HBV CORE IGG+IGM SER QL: NONREACTIVE
HBV SURFACE AB SER QL: NONREACTIVE
HCT VFR BLD CALC: 40.5 %
HEPATITIS A IGG ANTIBODY: REACTIVE
HGB BLD-MCNC: 12.9 G/DL
IMM GRANULOCYTES NFR BLD AUTO: 0.2 %
INR PPP: 1.08 RATIO
IRON SATN MFR SERPL: 26 %
IRON SERPL-MCNC: 98 UG/DL
LKM AB SER QL IF: <20.1 UNITS
LYMPHOCYTES # BLD AUTO: 2.72 K/UL
LYMPHOCYTES NFR BLD AUTO: 45 %
MAN DIFF?: NORMAL
MCHC RBC-ENTMCNC: 28 PG
MCHC RBC-ENTMCNC: 31.9 GM/DL
MCV RBC AUTO: 88 FL
MITOCHONDRIA AB SER IF-ACNC: NORMAL
MONOCYTES # BLD AUTO: 0.56 K/UL
MONOCYTES NFR BLD AUTO: 9.3 %
NEUTROPHILS # BLD AUTO: 2.63 K/UL
NEUTROPHILS NFR BLD AUTO: 43.4 %
PLATELET # BLD AUTO: 337 K/UL
POTASSIUM SERPL-SCNC: 4.2 MMOL/L
PROT SERPL-MCNC: 8.3 G/DL
PT BLD: 12.6 SEC
RBC # BLD: 4.6 M/UL
RBC # FLD: 13.9 %
SMOOTH MUSCLE AB SER QL IF: NORMAL
SODIUM SERPL-SCNC: 142 MMOL/L
TIBC SERPL-MCNC: 375 UG/DL
TTG IGG SER IA-ACNC: 1.3 U/ML
TTG IGG SER IA-ACNC: NEGATIVE
UIBC SERPL-MCNC: 277 UG/DL
WBC # FLD AUTO: 6.05 K/UL

## 2022-03-17 LAB — SOLUBLE LIVER IGG SER IA-ACNC: 1.3

## 2022-03-26 LAB
LYSOSOMAL ACID LIPASE INTERPRETATION: NORMAL
LYSOSOMAL ACID LIPASE: 134 CD:384473389

## 2022-04-25 ENCOUNTER — APPOINTMENT (OUTPATIENT)
Dept: HEPATOLOGY | Facility: CLINIC | Age: 62
End: 2022-04-25
Payer: COMMERCIAL

## 2022-04-25 PROCEDURE — 91200 LIVER ELASTOGRAPHY: CPT

## 2022-04-30 ENCOUNTER — RESULT REVIEW (OUTPATIENT)
Age: 62
End: 2022-04-30

## 2022-04-30 ENCOUNTER — APPOINTMENT (OUTPATIENT)
Dept: MRI IMAGING | Facility: IMAGING CENTER | Age: 62
End: 2022-04-30
Payer: COMMERCIAL

## 2022-04-30 ENCOUNTER — OUTPATIENT (OUTPATIENT)
Dept: OUTPATIENT SERVICES | Facility: HOSPITAL | Age: 62
LOS: 1 days | End: 2022-04-30
Payer: COMMERCIAL

## 2022-04-30 DIAGNOSIS — Z00.8 ENCOUNTER FOR OTHER GENERAL EXAMINATION: ICD-10-CM

## 2022-04-30 DIAGNOSIS — I25.10 ATHEROSCLEROTIC HEART DISEASE OF NATIVE CORONARY ARTERY WITHOUT ANGINA PECTORIS: Chronic | ICD-10-CM

## 2022-04-30 DIAGNOSIS — R74.8 ABNORMAL LEVELS OF OTHER SERUM ENZYMES: ICD-10-CM

## 2022-04-30 DIAGNOSIS — K76.0 FATTY (CHANGE OF) LIVER, NOT ELSEWHERE CLASSIFIED: ICD-10-CM

## 2022-04-30 PROCEDURE — A9585: CPT

## 2022-04-30 PROCEDURE — 74183 MRI ABD W/O CNTR FLWD CNTR: CPT | Mod: 26

## 2022-04-30 PROCEDURE — 74183 MRI ABD W/O CNTR FLWD CNTR: CPT

## 2022-04-30 PROCEDURE — 76391 MR ELASTOGRAPHY: CPT

## 2022-04-30 PROCEDURE — 76391 MR ELASTOGRAPHY: CPT | Mod: 26

## 2022-05-17 ENCOUNTER — APPOINTMENT (OUTPATIENT)
Dept: HEPATOLOGY | Facility: CLINIC | Age: 62
End: 2022-05-17
Payer: COMMERCIAL

## 2022-05-17 VITALS
DIASTOLIC BLOOD PRESSURE: 80 MMHG | SYSTOLIC BLOOD PRESSURE: 143 MMHG | RESPIRATION RATE: 16 BRPM | HEIGHT: 60 IN | TEMPERATURE: 97.2 F | HEART RATE: 78 BPM | BODY MASS INDEX: 36.91 KG/M2 | OXYGEN SATURATION: 99 % | WEIGHT: 188 LBS

## 2022-05-17 DIAGNOSIS — R74.8 ABNORMAL LEVELS OF OTHER SERUM ENZYMES: ICD-10-CM

## 2022-05-17 DIAGNOSIS — K76.0 FATTY (CHANGE OF) LIVER, NOT ELSEWHERE CLASSIFIED: ICD-10-CM

## 2022-05-17 PROCEDURE — 99214 OFFICE O/P EST MOD 30 MIN: CPT

## 2022-05-30 LAB
ACE BLD-CCNC: 5 U/L
ALP BLD-CCNC: 247 IU/L
ALP BONE CFR SERPL: 71 %
ALP INTEST CFR SERPL: 1 %
ALP LIVER CFR SERPL: 28 %

## 2022-08-04 ENCOUNTER — RX RENEWAL (OUTPATIENT)
Age: 62
End: 2022-08-04

## 2022-08-25 ENCOUNTER — APPOINTMENT (OUTPATIENT)
Dept: CARDIOLOGY | Facility: CLINIC | Age: 62
End: 2022-08-25

## 2022-08-25 VITALS
OXYGEN SATURATION: 100 % | SYSTOLIC BLOOD PRESSURE: 127 MMHG | HEART RATE: 60 BPM | HEIGHT: 60 IN | BODY MASS INDEX: 34.95 KG/M2 | DIASTOLIC BLOOD PRESSURE: 74 MMHG | WEIGHT: 178 LBS

## 2022-08-25 PROCEDURE — 99214 OFFICE O/P EST MOD 30 MIN: CPT | Mod: 25

## 2022-08-25 PROCEDURE — 93000 ELECTROCARDIOGRAM COMPLETE: CPT

## 2022-08-29 ENCOUNTER — NON-APPOINTMENT (OUTPATIENT)
Age: 62
End: 2022-08-29

## 2022-08-29 NOTE — DISCUSSION/SUMMARY
[FreeTextEntry1] : Cont with plavix due to bifurcation stents.\par c/w current meds for hchol and HTN\par \par F/u 6 mo  [EKG obtained to assist in diagnosis and management of assessed problem(s)] : EKG obtained to assist in diagnosis and management of assessed problem(s)

## 2022-08-29 NOTE — CARDIOLOGY SUMMARY
[de-identified] : 8/25/22\par Sinus  Rhythm \par -Right bundle branch block. \par \par ABNORMAL \par

## 2022-08-29 NOTE — HISTORY OF PRESENT ILLNESS
[FreeTextEntry1] : Rae returning for follow-up. \par No chest pains or tightness\par No palpitations \par Has lost weight and is feeling well\par \par

## 2022-11-02 ENCOUNTER — RX RENEWAL (OUTPATIENT)
Age: 62
End: 2022-11-02

## 2022-11-18 RX ORDER — CLOPIDOGREL BISULFATE 75 MG/1
75 TABLET, FILM COATED ORAL DAILY
Qty: 90 | Refills: 1 | Status: DISCONTINUED | COMMUNITY
Start: 2020-11-27 | End: 2022-11-18

## 2023-01-31 ENCOUNTER — RX RENEWAL (OUTPATIENT)
Age: 63
End: 2023-01-31

## 2023-05-02 ENCOUNTER — APPOINTMENT (OUTPATIENT)
Dept: CARDIOLOGY | Facility: CLINIC | Age: 63
End: 2023-05-02
Payer: COMMERCIAL

## 2023-05-02 ENCOUNTER — NON-APPOINTMENT (OUTPATIENT)
Age: 63
End: 2023-05-02

## 2023-05-02 VITALS
HEIGHT: 60 IN | HEART RATE: 67 BPM | BODY MASS INDEX: 35.73 KG/M2 | DIASTOLIC BLOOD PRESSURE: 80 MMHG | OXYGEN SATURATION: 99 % | SYSTOLIC BLOOD PRESSURE: 138 MMHG | WEIGHT: 182 LBS

## 2023-05-02 DIAGNOSIS — R00.2 PALPITATIONS: ICD-10-CM

## 2023-05-02 PROCEDURE — 99214 OFFICE O/P EST MOD 30 MIN: CPT | Mod: 25

## 2023-05-02 PROCEDURE — 93000 ELECTROCARDIOGRAM COMPLETE: CPT

## 2023-05-07 NOTE — REASON FOR VISIT
[Hyperlipidemia] : hyperlipidemia [Symptom and Test Evaluation] : symptom and test evaluation [Hypertension] : hypertension [Coronary Artery Disease] : coronary artery disease

## 2023-06-12 ENCOUNTER — TRANSCRIPTION ENCOUNTER (OUTPATIENT)
Age: 63
End: 2023-06-12

## 2023-06-12 RX ORDER — CLOPIDOGREL BISULFATE 75 MG/1
75 TABLET, FILM COATED ORAL DAILY
Qty: 1 | Refills: 3 | Status: ACTIVE | COMMUNITY
Start: 2022-11-18 | End: 1900-01-01

## 2023-06-16 NOTE — HISTORY OF PRESENT ILLNESS
[FreeTextEntry1] : Rae returning for follow-up. \par No chest pains or tightness\par No palpitations \par Feels occasional fatigue and HANSON\par \par \par

## 2023-06-16 NOTE — DISCUSSION/SUMMARY
[EKG obtained to assist in diagnosis and management of assessed problem(s)] : EKG obtained to assist in diagnosis and management of assessed problem(s) [FreeTextEntry1] : Cont with plavix due to bifurcation stents.\par c/w current meds for hchol and HTN\par Ischemic eval recc given bif stenting\par \par F/u 6 mo \par \par Addendum 6/16/23\par Pt planning to undergo breast surgery. She may switch to daily baby aspirin to limit bleeding (she may switch immediately) - she should remain on daily aspirin through her surgery and can switch back to daily plavix after her operation. She is otherwise optimized from a Cv perspective to proceed with the planned operation.

## 2023-06-16 NOTE — CARDIOLOGY SUMMARY
[de-identified] : 5/2/23\par Sinus  Rhythm \par -Right bundle branch block. \par \par ABNORMAL \par

## 2023-06-20 ENCOUNTER — OUTPATIENT (OUTPATIENT)
Dept: OUTPATIENT SERVICES | Facility: HOSPITAL | Age: 63
LOS: 1 days | End: 2023-06-20
Payer: COMMERCIAL

## 2023-06-20 VITALS
RESPIRATION RATE: 14 BRPM | HEART RATE: 72 BPM | HEIGHT: 60 IN | WEIGHT: 174.17 LBS | OXYGEN SATURATION: 99 % | TEMPERATURE: 98 F | DIASTOLIC BLOOD PRESSURE: 87 MMHG | SYSTOLIC BLOOD PRESSURE: 128 MMHG

## 2023-06-20 DIAGNOSIS — I10 ESSENTIAL (PRIMARY) HYPERTENSION: ICD-10-CM

## 2023-06-20 DIAGNOSIS — E78.5 HYPERLIPIDEMIA, UNSPECIFIED: ICD-10-CM

## 2023-06-20 DIAGNOSIS — Z95.5 PRESENCE OF CORONARY ANGIOPLASTY IMPLANT AND GRAFT: Chronic | ICD-10-CM

## 2023-06-20 DIAGNOSIS — Z95.5 PRESENCE OF CORONARY ANGIOPLASTY IMPLANT AND GRAFT: ICD-10-CM

## 2023-06-20 DIAGNOSIS — N60.81 OTHER BENIGN MAMMARY DYSPLASIAS OF RIGHT BREAST: ICD-10-CM

## 2023-06-20 DIAGNOSIS — N64.52 NIPPLE DISCHARGE: ICD-10-CM

## 2023-06-20 DIAGNOSIS — I25.10 ATHEROSCLEROTIC HEART DISEASE OF NATIVE CORONARY ARTERY WITHOUT ANGINA PECTORIS: Chronic | ICD-10-CM

## 2023-06-20 DIAGNOSIS — Z01.818 ENCOUNTER FOR OTHER PREPROCEDURAL EXAMINATION: ICD-10-CM

## 2023-06-20 DIAGNOSIS — I25.10 ATHEROSCLEROTIC HEART DISEASE OF NATIVE CORONARY ARTERY WITHOUT ANGINA PECTORIS: ICD-10-CM

## 2023-06-20 DIAGNOSIS — N60.82 OTHER BENIGN MAMMARY DYSPLASIAS OF LEFT BREAST: ICD-10-CM

## 2023-06-20 LAB
ANION GAP SERPL CALC-SCNC: 5 MMOL/L — SIGNIFICANT CHANGE UP (ref 5–17)
BUN SERPL-MCNC: 21 MG/DL — SIGNIFICANT CHANGE UP (ref 7–23)
CALCIUM SERPL-MCNC: 9.3 MG/DL — SIGNIFICANT CHANGE UP (ref 8.4–10.5)
CHLORIDE SERPL-SCNC: 103 MMOL/L — SIGNIFICANT CHANGE UP (ref 96–108)
CO2 SERPL-SCNC: 33 MMOL/L — HIGH (ref 22–31)
CREAT SERPL-MCNC: 1.12 MG/DL — SIGNIFICANT CHANGE UP (ref 0.5–1.3)
EGFR: 55 ML/MIN/1.73M2 — LOW
GLUCOSE SERPL-MCNC: 93 MG/DL — SIGNIFICANT CHANGE UP (ref 70–99)
HCT VFR BLD CALC: 35 % — SIGNIFICANT CHANGE UP (ref 34.5–45)
HGB BLD-MCNC: 11.6 G/DL — SIGNIFICANT CHANGE UP (ref 11.5–15.5)
MCHC RBC-ENTMCNC: 29 PG — SIGNIFICANT CHANGE UP (ref 27–34)
MCHC RBC-ENTMCNC: 33.1 GM/DL — SIGNIFICANT CHANGE UP (ref 32–36)
MCV RBC AUTO: 87.5 FL — SIGNIFICANT CHANGE UP (ref 80–100)
NRBC # BLD: 0 /100 WBCS — SIGNIFICANT CHANGE UP (ref 0–0)
PLATELET # BLD AUTO: 450 K/UL — HIGH (ref 150–400)
POTASSIUM SERPL-MCNC: 3.6 MMOL/L — SIGNIFICANT CHANGE UP (ref 3.5–5.3)
POTASSIUM SERPL-SCNC: 3.6 MMOL/L — SIGNIFICANT CHANGE UP (ref 3.5–5.3)
RBC # BLD: 4 M/UL — SIGNIFICANT CHANGE UP (ref 3.8–5.2)
RBC # FLD: 13.4 % — SIGNIFICANT CHANGE UP (ref 10.3–14.5)
SODIUM SERPL-SCNC: 141 MMOL/L — SIGNIFICANT CHANGE UP (ref 135–145)
WBC # BLD: 6.41 K/UL — SIGNIFICANT CHANGE UP (ref 3.8–10.5)
WBC # FLD AUTO: 6.41 K/UL — SIGNIFICANT CHANGE UP (ref 3.8–10.5)

## 2023-06-20 PROCEDURE — G0463: CPT

## 2023-06-20 PROCEDURE — 36415 COLL VENOUS BLD VENIPUNCTURE: CPT

## 2023-06-20 PROCEDURE — 85027 COMPLETE CBC AUTOMATED: CPT

## 2023-06-20 PROCEDURE — 80048 BASIC METABOLIC PNL TOTAL CA: CPT

## 2023-06-20 RX ORDER — TIZANIDINE 4 MG/1
0 TABLET ORAL
Qty: 0 | Refills: 0 | DISCHARGE

## 2023-06-20 NOTE — H&P PST ADULT - NSANTHOSAYNRD_GEN_A_CORE
neck 14inches/No. JANIYA screening performed.  STOP BANG Legend: 0-2 = LOW Risk; 3-4 = INTERMEDIATE Risk; 5-8 = HIGH Risk

## 2023-06-20 NOTE — H&P PST ADULT - PROBLEM SELECTOR PLAN 4
Pt to HOLD all anticoagulant as instructed by cardiologist   To continue aspirin as per cardiologist

## 2023-06-20 NOTE — H&P PST ADULT - NSICDXPASTMEDICALHX_GEN_ALL_CORE_FT
PAST MEDICAL HISTORY:  Benign breast lumps     CAD (coronary artery disease)     HLD (hyperlipidemia)     HTN (hypertension)

## 2023-06-20 NOTE — H&P PST ADULT - PROBLEM SELECTOR PLAN 1
Pre-op instructions given. Pt verbalized understanding  Chlorhexidine wash instructions given  Pt instructed to hold all NSAIDs + herbal supplements/vitamins 7 days before surgery  Pt to HOLD all anticoagulant as instructed by cardiologist   To continue aspirin as per cardiologist   Pending: lab results  Pending: M/C   Cardiology clearance in chart - pt optimized

## 2023-06-20 NOTE — H&P PST ADULT - HISTORY OF PRESENT ILLNESS
63yo female with medical h/o HTN, HLD, and CAD, s/p stent x2 placed 2010 on Plavix + Aspirin. Pt reports benign mass bilateral breast, Pt present today for PST for scheduled Bilateral Excisional Biopsy w/Bilateral Adriana  (pt reports adriana not done as yet) scheduled for 6/22/2023.

## 2023-06-21 ENCOUNTER — OUTPATIENT (OUTPATIENT)
Dept: OUTPATIENT SERVICES | Facility: HOSPITAL | Age: 63
LOS: 1 days | End: 2023-06-21
Payer: COMMERCIAL

## 2023-06-21 ENCOUNTER — TRANSCRIPTION ENCOUNTER (OUTPATIENT)
Age: 63
End: 2023-06-21

## 2023-06-21 ENCOUNTER — APPOINTMENT (OUTPATIENT)
Dept: MAMMOGRAPHY | Facility: CLINIC | Age: 63
End: 2023-06-21
Payer: COMMERCIAL

## 2023-06-21 DIAGNOSIS — Z00.8 ENCOUNTER FOR OTHER GENERAL EXAMINATION: ICD-10-CM

## 2023-06-21 DIAGNOSIS — Z95.5 PRESENCE OF CORONARY ANGIOPLASTY IMPLANT AND GRAFT: Chronic | ICD-10-CM

## 2023-06-21 PROBLEM — N63.0 UNSPECIFIED LUMP IN UNSPECIFIED BREAST: Chronic | Status: ACTIVE | Noted: 2023-06-20

## 2023-06-21 PROCEDURE — 19281 PERQ DEVICE BREAST 1ST IMAG: CPT

## 2023-06-21 PROCEDURE — C1739: CPT

## 2023-06-21 PROCEDURE — 19281 PERQ DEVICE BREAST 1ST IMAG: CPT | Mod: RT

## 2023-06-22 ENCOUNTER — TRANSCRIPTION ENCOUNTER (OUTPATIENT)
Age: 63
End: 2023-06-22

## 2023-06-22 ENCOUNTER — OUTPATIENT (OUTPATIENT)
Dept: OUTPATIENT SERVICES | Facility: HOSPITAL | Age: 63
LOS: 1 days | End: 2023-06-22
Payer: COMMERCIAL

## 2023-06-22 VITALS
OXYGEN SATURATION: 98 % | SYSTOLIC BLOOD PRESSURE: 130 MMHG | TEMPERATURE: 97 F | RESPIRATION RATE: 16 BRPM | DIASTOLIC BLOOD PRESSURE: 70 MMHG | HEART RATE: 77 BPM

## 2023-06-22 VITALS
RESPIRATION RATE: 15 BRPM | HEART RATE: 62 BPM | WEIGHT: 174.17 LBS | HEIGHT: 60 IN | TEMPERATURE: 98 F | SYSTOLIC BLOOD PRESSURE: 126 MMHG | DIASTOLIC BLOOD PRESSURE: 71 MMHG | OXYGEN SATURATION: 97 %

## 2023-06-22 DIAGNOSIS — N60.82 OTHER BENIGN MAMMARY DYSPLASIAS OF LEFT BREAST: ICD-10-CM

## 2023-06-22 DIAGNOSIS — N60.81 OTHER BENIGN MAMMARY DYSPLASIAS OF RIGHT BREAST: ICD-10-CM

## 2023-06-22 DIAGNOSIS — N64.52 NIPPLE DISCHARGE: ICD-10-CM

## 2023-06-22 DIAGNOSIS — Z95.5 PRESENCE OF CORONARY ANGIOPLASTY IMPLANT AND GRAFT: Chronic | ICD-10-CM

## 2023-06-22 PROCEDURE — 19125 EXCISION BREAST LESION: CPT | Mod: 50

## 2023-06-22 PROCEDURE — 76098 X-RAY EXAM SURGICAL SPECIMEN: CPT

## 2023-06-22 PROCEDURE — 88341 IMHCHEM/IMCYTCHM EA ADD ANTB: CPT

## 2023-06-22 PROCEDURE — 88307 TISSUE EXAM BY PATHOLOGIST: CPT | Mod: 26

## 2023-06-22 PROCEDURE — 88342 IMHCHEM/IMCYTCHM 1ST ANTB: CPT

## 2023-06-22 PROCEDURE — 88342 IMHCHEM/IMCYTCHM 1ST ANTB: CPT | Mod: 26

## 2023-06-22 PROCEDURE — 88307 TISSUE EXAM BY PATHOLOGIST: CPT

## 2023-06-22 PROCEDURE — 76098 X-RAY EXAM SURGICAL SPECIMEN: CPT | Mod: 26

## 2023-06-22 PROCEDURE — 88341 IMHCHEM/IMCYTCHM EA ADD ANTB: CPT | Mod: 26

## 2023-06-22 RX ORDER — HYDROMORPHONE HYDROCHLORIDE 2 MG/ML
0.5 INJECTION INTRAMUSCULAR; INTRAVENOUS; SUBCUTANEOUS
Refills: 0 | Status: DISCONTINUED | OUTPATIENT
Start: 2023-06-22 | End: 2023-06-22

## 2023-06-22 RX ORDER — SODIUM CHLORIDE 9 MG/ML
1000 INJECTION, SOLUTION INTRAVENOUS
Refills: 0 | Status: DISCONTINUED | OUTPATIENT
Start: 2023-06-22 | End: 2023-06-22

## 2023-06-22 RX ORDER — ONDANSETRON 8 MG/1
4 TABLET, FILM COATED ORAL ONCE
Refills: 0 | Status: DISCONTINUED | OUTPATIENT
Start: 2023-06-22 | End: 2023-06-22

## 2023-06-22 RX ORDER — CHLORTHALIDONE 50 MG
1 TABLET ORAL
Qty: 0 | Refills: 0 | DISCHARGE

## 2023-06-22 RX ORDER — OXYCODONE HYDROCHLORIDE 5 MG/1
5 TABLET ORAL EVERY 6 HOURS
Refills: 0 | Status: DISCONTINUED | OUTPATIENT
Start: 2023-06-22 | End: 2023-06-22

## 2023-06-22 RX ORDER — LISINOPRIL 2.5 MG/1
1 TABLET ORAL
Qty: 0 | Refills: 0 | DISCHARGE

## 2023-06-22 RX ADMIN — SODIUM CHLORIDE 50 MILLILITER(S): 9 INJECTION, SOLUTION INTRAVENOUS at 12:44

## 2023-06-22 NOTE — ASU DISCHARGE PLAN (ADULT/PEDIATRIC) - ASU DC SPECIAL INSTRUCTIONSFT
Pain medication is given immediately following your surgery to help with post-operative pain. Upon  your discharge home, we suggest scheduled doses of Acetaminophen (tylenol) every 6 hours and  Ibuprofen (Motrin) every 6 hours, alternating between medications every 3 hours (i.e. Take tylenol  and wait 3 hours, then take Motrin and wait 3 hours, repeat) for the first 3-4 days after surgery. If  you are without significant pain, medications can be taken more infrequently. It is important to  follow dosing instructions on the medication bottle or prescription.

## 2023-06-22 NOTE — ASU PATIENT PROFILE, ADULT - FALL HARM RISK - UNIVERSAL INTERVENTIONS
Bed in lowest position, wheels locked, appropriate side rails in place/Call bell, personal items and telephone in reach/Instruct patient to call for assistance before getting out of bed or chair/Non-slip footwear when patient is out of bed/[place] to call system/Physically safe environment - no spills, clutter or unnecessary equipment/Purposeful Proactive Rounding/Room/bathroom lighting operational, light cord in reach

## 2023-06-22 NOTE — ASU DISCHARGE PLAN (ADULT/PEDIATRIC) - CARE PROVIDER_API CALL
Matilde Reynoso  Surgery  [address] [place] [address]  Phone: [phone]  Fax: [phone]  Follow Up Time: 2 weeks

## 2023-06-24 RX ORDER — CLOPIDOGREL BISULFATE 75 MG/1
1 TABLET, FILM COATED ORAL
Qty: 0 | Refills: 0 | DISCHARGE
Start: 2023-06-24

## 2023-07-05 LAB — SURGICAL PATHOLOGY STUDY: SIGNIFICANT CHANGE UP

## 2023-07-31 ENCOUNTER — RX RENEWAL (OUTPATIENT)
Age: 63
End: 2023-07-31

## 2023-10-03 ENCOUNTER — APPOINTMENT (OUTPATIENT)
Dept: CV DIAGNOSTICS | Facility: HOSPITAL | Age: 63
End: 2023-10-03

## 2023-10-03 ENCOUNTER — OUTPATIENT (OUTPATIENT)
Dept: OUTPATIENT SERVICES | Facility: HOSPITAL | Age: 63
LOS: 1 days | End: 2023-10-03
Payer: MEDICARE

## 2023-10-03 DIAGNOSIS — Z95.5 PRESENCE OF CORONARY ANGIOPLASTY IMPLANT AND GRAFT: Chronic | ICD-10-CM

## 2023-10-03 DIAGNOSIS — I25.10 ATHEROSCLEROTIC HEART DISEASE OF NATIVE CORONARY ARTERY WITHOUT ANGINA PECTORIS: ICD-10-CM

## 2023-10-03 PROCEDURE — 78452 HT MUSCLE IMAGE SPECT MULT: CPT | Mod: 26,MH

## 2023-10-03 PROCEDURE — 78452 HT MUSCLE IMAGE SPECT MULT: CPT | Mod: MH

## 2023-10-03 PROCEDURE — 93016 CV STRESS TEST SUPVJ ONLY: CPT | Mod: MH

## 2023-10-03 PROCEDURE — A9500: CPT

## 2023-10-03 PROCEDURE — 93017 CV STRESS TEST TRACING ONLY: CPT

## 2023-10-03 PROCEDURE — 93018 CV STRESS TEST I&R ONLY: CPT | Mod: MH

## 2023-10-09 RX ORDER — ASPIRIN/CALCIUM CARB/MAGNESIUM 324 MG
1 TABLET ORAL
Refills: 0 | DISCHARGE

## 2023-10-10 ENCOUNTER — APPOINTMENT (OUTPATIENT)
Dept: CARDIOLOGY | Facility: CLINIC | Age: 63
End: 2023-10-10
Payer: MEDICARE

## 2023-10-10 VITALS
HEIGHT: 60 IN | BODY MASS INDEX: 34.75 KG/M2 | DIASTOLIC BLOOD PRESSURE: 77 MMHG | OXYGEN SATURATION: 100 % | WEIGHT: 177 LBS | HEART RATE: 75 BPM | SYSTOLIC BLOOD PRESSURE: 118 MMHG

## 2023-10-10 DIAGNOSIS — R07.9 CHEST PAIN, UNSPECIFIED: ICD-10-CM

## 2023-10-10 PROCEDURE — 99214 OFFICE O/P EST MOD 30 MIN: CPT

## 2023-10-10 PROCEDURE — 93000 ELECTROCARDIOGRAM COMPLETE: CPT

## 2023-10-11 ENCOUNTER — OUTPATIENT (OUTPATIENT)
Dept: OUTPATIENT SERVICES | Facility: HOSPITAL | Age: 63
LOS: 1 days | End: 2023-10-11
Payer: MEDICARE

## 2023-10-11 ENCOUNTER — TRANSCRIPTION ENCOUNTER (OUTPATIENT)
Age: 63
End: 2023-10-11

## 2023-10-11 VITALS
OXYGEN SATURATION: 100 % | TEMPERATURE: 97 F | DIASTOLIC BLOOD PRESSURE: 70 MMHG | HEART RATE: 62 BPM | WEIGHT: 179.02 LBS | RESPIRATION RATE: 14 BRPM | SYSTOLIC BLOOD PRESSURE: 107 MMHG

## 2023-10-11 VITALS — HEART RATE: 92 BPM | SYSTOLIC BLOOD PRESSURE: 124 MMHG | DIASTOLIC BLOOD PRESSURE: 64 MMHG

## 2023-10-11 DIAGNOSIS — Z98.890 OTHER SPECIFIED POSTPROCEDURAL STATES: Chronic | ICD-10-CM

## 2023-10-11 DIAGNOSIS — Z95.5 PRESENCE OF CORONARY ANGIOPLASTY IMPLANT AND GRAFT: Chronic | ICD-10-CM

## 2023-10-11 DIAGNOSIS — Z95.818 PRESENCE OF OTHER CARDIAC IMPLANTS AND GRAFTS: ICD-10-CM

## 2023-10-11 DIAGNOSIS — R94.39 ABNORMAL RESULT OF OTHER CARDIOVASCULAR FUNCTION STUDY: ICD-10-CM

## 2023-10-11 LAB
ANION GAP SERPL CALC-SCNC: 4 MMOL/L — LOW (ref 5–17)
BUN SERPL-MCNC: 15 MG/DL — SIGNIFICANT CHANGE UP (ref 7–23)
CALCIUM SERPL-MCNC: 9.7 MG/DL — SIGNIFICANT CHANGE UP (ref 8.5–10.1)
CHLORIDE SERPL-SCNC: 105 MMOL/L — SIGNIFICANT CHANGE UP (ref 96–108)
CO2 SERPL-SCNC: 33 MMOL/L — HIGH (ref 22–31)
CREAT SERPL-MCNC: 1 MG/DL — SIGNIFICANT CHANGE UP (ref 0.5–1.3)
EGFR: 63 ML/MIN/1.73M2 — SIGNIFICANT CHANGE UP
GLUCOSE SERPL-MCNC: 98 MG/DL — SIGNIFICANT CHANGE UP (ref 70–99)
HCT VFR BLD CALC: 39.2 % — SIGNIFICANT CHANGE UP (ref 34.5–45)
HGB BLD-MCNC: 12.8 G/DL — SIGNIFICANT CHANGE UP (ref 11.5–15.5)
MCHC RBC-ENTMCNC: 28.3 PG — SIGNIFICANT CHANGE UP (ref 27–34)
MCHC RBC-ENTMCNC: 32.7 GM/DL — SIGNIFICANT CHANGE UP (ref 32–36)
MCV RBC AUTO: 86.5 FL — SIGNIFICANT CHANGE UP (ref 80–100)
NRBC # BLD: 0 /100 WBCS — SIGNIFICANT CHANGE UP (ref 0–0)
PLATELET # BLD AUTO: 403 K/UL — HIGH (ref 150–400)
POTASSIUM SERPL-MCNC: 3.7 MMOL/L — SIGNIFICANT CHANGE UP (ref 3.5–5.3)
POTASSIUM SERPL-SCNC: 3.7 MMOL/L — SIGNIFICANT CHANGE UP (ref 3.5–5.3)
RBC # BLD: 4.53 M/UL — SIGNIFICANT CHANGE UP (ref 3.8–5.2)
RBC # FLD: 13.5 % — SIGNIFICANT CHANGE UP (ref 10.3–14.5)
SODIUM SERPL-SCNC: 142 MMOL/L — SIGNIFICANT CHANGE UP (ref 135–145)
WBC # BLD: 5.24 K/UL — SIGNIFICANT CHANGE UP (ref 3.8–10.5)
WBC # FLD AUTO: 5.24 K/UL — SIGNIFICANT CHANGE UP (ref 3.8–10.5)

## 2023-10-11 PROCEDURE — C1887: CPT

## 2023-10-11 PROCEDURE — C1894: CPT

## 2023-10-11 PROCEDURE — 80048 BASIC METABOLIC PNL TOTAL CA: CPT

## 2023-10-11 PROCEDURE — C1769: CPT

## 2023-10-11 PROCEDURE — 99152 MOD SED SAME PHYS/QHP 5/>YRS: CPT

## 2023-10-11 PROCEDURE — 93458 L HRT ARTERY/VENTRICLE ANGIO: CPT | Mod: 26

## 2023-10-11 PROCEDURE — 93005 ELECTROCARDIOGRAM TRACING: CPT | Mod: XU

## 2023-10-11 PROCEDURE — 93799 UNLISTED CV SVC/PROCEDURE: CPT | Mod: LD

## 2023-10-11 PROCEDURE — 85027 COMPLETE CBC AUTOMATED: CPT

## 2023-10-11 PROCEDURE — C1876: CPT

## 2023-10-11 PROCEDURE — 93571 IV DOP VEL&/PRESS C FLO 1ST: CPT | Mod: 26,52,LD

## 2023-10-11 PROCEDURE — 93010 ELECTROCARDIOGRAM REPORT: CPT

## 2023-10-11 PROCEDURE — 36415 COLL VENOUS BLD VENIPUNCTURE: CPT

## 2023-10-11 PROCEDURE — 93458 L HRT ARTERY/VENTRICLE ANGIO: CPT

## 2023-10-11 RX ORDER — SODIUM CHLORIDE 9 MG/ML
250 INJECTION INTRAMUSCULAR; INTRAVENOUS; SUBCUTANEOUS ONCE
Refills: 0 | Status: COMPLETED | OUTPATIENT
Start: 2023-10-11 | End: 2023-10-11

## 2023-10-11 RX ORDER — SIMVASTATIN 20 MG/1
1 TABLET, FILM COATED ORAL
Refills: 0 | DISCHARGE

## 2023-10-11 RX ORDER — LISINOPRIL 2.5 MG/1
1 TABLET ORAL
Refills: 0 | DISCHARGE

## 2023-10-11 RX ORDER — SODIUM CHLORIDE 9 MG/ML
500 INJECTION INTRAMUSCULAR; INTRAVENOUS; SUBCUTANEOUS
Refills: 0 | Status: DISCONTINUED | OUTPATIENT
Start: 2023-10-11 | End: 2023-10-25

## 2023-10-11 RX ORDER — HYDROCHLOROTHIAZIDE 25 MG
1 TABLET ORAL
Refills: 0 | DISCHARGE

## 2023-10-11 RX ADMIN — SODIUM CHLORIDE 75 MILLILITER(S): 9 INJECTION INTRAMUSCULAR; INTRAVENOUS; SUBCUTANEOUS at 11:59

## 2023-10-11 RX ADMIN — SODIUM CHLORIDE 75 MILLILITER(S): 9 INJECTION INTRAMUSCULAR; INTRAVENOUS; SUBCUTANEOUS at 11:42

## 2023-10-11 RX ADMIN — SODIUM CHLORIDE 500 MILLILITER(S): 9 INJECTION INTRAMUSCULAR; INTRAVENOUS; SUBCUTANEOUS at 11:59

## 2023-10-11 NOTE — ASU DISCHARGE PLAN (ADULT/PEDIATRIC) - ASU DC SPECIAL INSTRUCTIONSFT
Wound Care:   the day AFTER your procedure...     Remove the bandage from the site and gently clean with soap and water then pat dry; leave open to air.     You may take a brief shower     Do NOT apply lotions, creams, powders, ointments, or perfumes to your incision site unless prescribed by your physician     Do NOT soak your procedure site for 1 week (no baths, no pools, no tubs, etc...)     Check  your groin and /or wrist daily. A small amount of bruising, and soreness are normal    ACTIVITY: for 24 hours      - DO NOT DRIVE     - DO NOT make any important decisions or sign legal documents      - DO NOT operate heavy machinery      - you may resume sexual activity in 48 hours, unless otherwise instructed by your cardiologist          If your procedure was done through the WRIST: for the NEXT 3 DAYS:          - avoid pushing, pulling, with that affected wrist (such as pushing up from a seated position)          - avoid repeated movement of that hand and wrist (such as typing or hammering)          - DO NOT LIFT anything more than 5 pounds         If your procedure was done through the GROIN: for the NEXT 5 DAYS          - Limit climbing stairs, DO NOT soak in bathtub or pool          - no strenuous activities, pushing, pulling, straining          - Do not lift anything more than 10 pounds     MEDICATION:      Please take your medications as explained to you (found on your discharge paperwork)      DO NOT STOP taking them without consulting with your cardiologist first.      **if you have diabetes and take metformin please do not take this medication for 2 days after the procedure. Restart and take as usual starting day 3.    Follow the heart healthy diet recommended by your doctor.   Drink plenty of water for the next 24 hours unless otherwise instructed.  Do not drink any alcoholic beverages for 24 hours (beer, wine, liquor, etc).    If you smoke: STOP SMOKING. Call the [facility] for Tobacco Control at [phone] for assistance.    CALL your cardiologist/primary care doctor to make a follow-up appointment in 2 WEEKS     **CALL YOUR DOCTOR if you experience     fever, chills, body aches, or severe pain, swelling, redness, heat or yellow discharge at incision site     bleeding or excruciating pain at the procedural site, swelling (golf ball size) at your procedural site     CHEST PAIN     numbness, tingling, temperature change (of your procedural site)     Pain  -you may have pain after your surgery or procedure at the puncture site or in the artery/vein that has been treated.  -take pain medication as directed by your doctor.  call your doctor if your pain is not getting better within 5 days or if it gets worse  -prescription pain medication should be taken with food, and can cause constipation, an over-the-counter softener may be helpful    Nausea  -anesthesia/sedation can upset your stomach  -eat bland foods (Jell-o, crackers, toast) and drink ginger ale if you are nauseated  -drink plenty of fluids such as water or ginger ale (unless instructed otherwise by your doctor)  -if you have nausea or vomiting the day after your procedure, call your doctor    Bleeding  -you may have a small amount of oozing from your surgical or procedural site  -bleeding as the site can be dangerous and should prompt immediate medical attention    Infection  -if you have any of the following signs of infection, call your doctor:       redness, swelling, fever over 101 degrees, thick yellow/white drainage    If you are unable to reach your doctor, you may contact:   Dr Pat Leonard @ [phone]    **Call 911 immediately if:     - your hand or leg becomes blue, feels cold to touch, or if you have numbness or tingling     - bleeding or swelling from your wrist or groin site cannot be controlled or if area becomes very red or hot to touch     - you have pain, pressure, tightness or burning in your chest, arms, jaw or stomach; shortness of breath; nausea or excessive sweating; lightheadedness; dizziness or a fainting spell; or if you have sudden back or stomach pain     -you have rapid heartbeat or palpitations     - you have bright red blood in large amounts, severe pain at access site (wrist or groin) or significant new swelling at the puncture site

## 2023-10-11 NOTE — H&P CARDIOLOGY - NSICDXPASTSURGICALHX_GEN_ALL_CORE_FT
PAST SURGICAL HISTORY:  H/O:      H/O: hysterectomy     S/P bilateral breast lumpectomy     Stented coronary artery

## 2023-10-11 NOTE — ASU DISCHARGE PLAN (ADULT/PEDIATRIC) - PROVIDER TOKENS
FREE:[LAST:[Horacio],FIRST:[Pat],PHONE:[phone]],FAX:[(   )    -],ADDRESS:[[address] [place] [address]],FOLLOWUP:[2 weeks]]

## 2023-10-11 NOTE — ASU PATIENT PROFILE, ADULT - FALL HARM RISK - UNIVERSAL INTERVENTIONS
Bed in lowest position, wheels locked, appropriate side rails in place/Call bell, personal items and telephone in reach/Instruct patient to call for assistance before getting out of bed or chair/Non-slip footwear when patient is out of bed/[place] to call system/Physically safe environment - no spills, clutter or unnecessary equipment/Purposeful Proactive Rounding/Room/bathroom lighting operational, light cord in reach

## 2023-10-11 NOTE — H&P CARDIOLOGY - HISTORY OF PRESENT ILLNESS
63 year old female with PMH CAD (stent x 1), HLD, NAFLD, HTN.    23 pt seen by Dr Leonard for symptom and test evaluation hyperlipidemia, hypertension and coronary artery disease. Pre-op w/u for bilat breast lumpectomies. No CP or SOB, feels occasional fatigue and HANSON.  EC23 Sinus Rhythm, Right bundle branch block.   23 addendum: Pt planning to undergo breast surgery. She may switch to daily baby aspirin to limit bleeding (she may switch immediately) - she should remain on daily aspirin through her surgery and can switch back to daily plavix after her operation. She is otherwise optimized from a Cv perspective to proceed with the planned operation.     < from: Nuclear Stress Test-Exercise.. (10.03.23 @ 08:36) >  Conclusions:   1. Myocardial Perfusion: Abnormal.   2. The patient underwent stress testing using the standard Lenny protocol.      _ The patient exercised for 8 min 59 sec.      _ The test was stopped due to fatigue.      _ The patient achieved 10.1 METS which is consistent with good exercise capacity.   3. Baseline electrocardiogram: normal sinus rhythm at a rate of 72 bpm with right bundle branch block and nonspecific ST-T wave abnormalities.   4. Stress electrocardiogram: There were approximately 1.5 mm horizontal ST segment depressions in leads V3-V6 starting at 3:00 min of exercise at 118 bpm and persisting 1:50 min of recovery. At 3:50 min of recovery at 93 bpm, approximately 0.5 mm horizontal ST segment depressions developed inthese leads and persisted at least 4:50 min in recovery.   5. Perfusion Findings:      --There are large, mild to moderate defects in the anterior, anteroseptal, anterolateral, and apical walls that are mostly reversible suggestive of infarct with significant marcos-infarct ischemia.      --There are large, mild to moderate defects in the inferior, basal inferolateral, and basal inferoseptal walls that are partially reversible suggestive of infarct with at least moderate marcos-infarct ischemia.   6.The left ventricle is normal in size. The left ventricular EF% during stress is 69 %.   7. Post-stress gated wall motion analysis revealed hypokinesis of the basal anteroseptal wall and reduced systolic thickening of the anterior, anterolateral, mid to distal anteroseptal, apical, inferior, basal inferolateral, and basal inferoseptal walls.  < end of copied text >    10/11/23 pt presents today for [facility] with Dr Leonard.

## 2023-10-11 NOTE — ASU DISCHARGE PLAN (ADULT/PEDIATRIC) - NS MD DC FALL RISK RISK
For information on Fall & Injury Prevention, visit: [email or web address] OR  [email or web address] OR  https://www.cdc.gov/steadi/patient.html
no

## 2023-10-11 NOTE — ASU DISCHARGE PLAN (ADULT/PEDIATRIC) - CARE PROVIDER_API CALL
Pat Leonard  [address] [place] [address]  Phone: [phone]  Fax: (   )    -  Follow Up Time: 2 weeks

## 2023-10-11 NOTE — CHART NOTE - NSCHARTNOTEFT_GEN_A_CORE
Post Diagnostic Cardiac Catheterization Chart Note      ***Prelim cath report:   closed diag with good collateral  LAD stent with 30% stenosis, no flow compromise  full/official report to follow      Patient without complaints. Denies CP, SOB, palpitations, N/V, fever/chills, abd pain, numbness/tingling/weakness, other c/o at this time.    A+O x 3, neurologically intact  RRA access site stable (clean, dry, intact, without bleeding, heat, erythema, or hematoma). Radial band in place.  RUE motor, neuro, circ intact.  Hemodynamically stable, neurologically intact, VS stable, afebrile    A/P: s/p diagnostic LHC  continue medical therapy  d/c home once post cath recovery completed / discharge criteria met and wrist / hemodynamics remain stable (with radial band removed).   see discharge for instructions/plan

## 2023-10-30 ENCOUNTER — RX RENEWAL (OUTPATIENT)
Age: 63
End: 2023-10-30

## 2023-10-30 RX ORDER — HYDROCHLOROTHIAZIDE 12.5 MG/1
12.5 TABLET ORAL DAILY
Qty: 90 | Refills: 3 | Status: ACTIVE | COMMUNITY
Start: 2020-10-12 | End: 1900-01-01

## 2024-01-07 NOTE — HISTORY OF PRESENT ILLNESS
[FreeTextEntry1] : Rae returning due to a recent abn stress test Notes inermittent fatigue and HANSON No syncope No palps

## 2024-01-07 NOTE — PHYSICAL EXAM
[Well Developed] : well developed [Well Nourished] : well nourished [No Acute Distress] : no acute distress [Normal Conjunctiva] : normal conjunctiva [Normal Venous Pressure] : normal venous pressure [No Carotid Bruit] : no carotid bruit [Normal S1, S2] : normal S1, S2 [No Murmur] : no murmur [No Rub] : no rub [Clear Lung Fields] : clear lung fields [No Gallop] : no gallop [Good Air Entry] : good air entry [No Respiratory Distress] : no respiratory distress  [Soft] : abdomen soft [Non Tender] : non-tender [Normal Bowel Sounds] : normal bowel sounds [No Masses/organomegaly] : no masses/organomegaly [Normal Gait] : normal gait [No Edema] : no edema [No Cyanosis] : no cyanosis [No Clubbing] : no clubbing [No Rash] : no rash [No Varicosities] : no varicosities [No Skin Lesions] : no skin lesions [Moves all extremities] : moves all extremities [No Focal Deficits] : no focal deficits [Normal Speech] : normal speech [Alert and Oriented] : alert and oriented [Normal memory] : normal memory

## 2024-01-07 NOTE — DISCUSSION/SUMMARY
[EKG obtained to assist in diagnosis and management of assessed problem(s)] : EKG obtained to assist in diagnosis and management of assessed problem(s) [FreeTextEntry1] : Abn stress test - recc cardiac cath

## 2024-01-07 NOTE — REASON FOR VISIT
[Symptom and Test Evaluation] : symptom and test evaluation [Coronary Artery Disease] : coronary artery disease [Hyperlipidemia] : hyperlipidemia

## 2024-02-06 ENCOUNTER — APPOINTMENT (OUTPATIENT)
Dept: CARDIOLOGY | Facility: CLINIC | Age: 64
End: 2024-02-06
Payer: MEDICARE

## 2024-02-06 VITALS
WEIGHT: 188 LBS | HEIGHT: 60 IN | HEART RATE: 69 BPM | DIASTOLIC BLOOD PRESSURE: 76 MMHG | SYSTOLIC BLOOD PRESSURE: 131 MMHG | BODY MASS INDEX: 36.91 KG/M2 | OXYGEN SATURATION: 100 %

## 2024-02-06 DIAGNOSIS — I25.10 ATHEROSCLEROTIC HEART DISEASE OF NATIVE CORONARY ARTERY W/OUT ANGINA PECTORIS: ICD-10-CM

## 2024-02-06 DIAGNOSIS — E78.5 HYPERLIPIDEMIA, UNSPECIFIED: ICD-10-CM

## 2024-02-06 PROCEDURE — 93000 ELECTROCARDIOGRAM COMPLETE: CPT

## 2024-02-06 PROCEDURE — 99215 OFFICE O/P EST HI 40 MIN: CPT

## 2024-02-06 RX ORDER — LETROZOLE TABLETS 2.5 MG/1
2.5 TABLET, FILM COATED ORAL
Refills: 0 | Status: ACTIVE | COMMUNITY
Start: 2024-02-06

## 2024-02-06 RX ORDER — ROSUVASTATIN CALCIUM 20 MG/1
20 TABLET, FILM COATED ORAL
Refills: 0 | Status: ACTIVE | COMMUNITY
Start: 2024-02-06

## 2024-02-13 NOTE — HISTORY OF PRESENT ILLNESS
[FreeTextEntry1] : Rae costa  Has lost weight and started an exercise program Recent labs reviewed No CP No palps

## 2024-02-13 NOTE — DISCUSSION/SUMMARY
[FreeTextEntry1] : CAD- [id number] with collaterals. Mod LAD dz without symptoms and normal IFR Labs reviewed Encouraged exercise If recurrent symptoms will proceed with cath and PCI [EKG obtained to assist in diagnosis and management of assessed problem(s)] : EKG obtained to assist in diagnosis and management of assessed problem(s)

## 2024-04-16 ENCOUNTER — APPOINTMENT (OUTPATIENT)
Dept: ORTHOPEDIC SURGERY | Facility: CLINIC | Age: 64
End: 2024-04-16
Payer: MEDICARE

## 2024-04-16 DIAGNOSIS — M25.562 PAIN IN RIGHT KNEE: ICD-10-CM

## 2024-04-16 DIAGNOSIS — M25.561 PAIN IN RIGHT KNEE: ICD-10-CM

## 2024-04-16 DIAGNOSIS — G89.29 PAIN IN RIGHT KNEE: ICD-10-CM

## 2024-04-16 PROCEDURE — 73564 X-RAY EXAM KNEE 4 OR MORE: CPT | Mod: LT

## 2024-04-16 PROCEDURE — 99203 OFFICE O/P NEW LOW 30 MIN: CPT

## 2024-04-17 PROBLEM — M25.561 ACUTE PAIN OF BOTH KNEES: Status: ACTIVE | Noted: 2024-04-17

## 2024-04-17 PROBLEM — M25.561 CHRONIC PAIN OF BOTH KNEES: Status: ACTIVE | Noted: 2024-04-17

## 2024-04-17 NOTE — HISTORY OF PRESENT ILLNESS
[de-identified] : 63 year old female presents today with bilateral knee pain x 3-4 months. R>L No injury reported. The pain is intermittent brought on with walking and getting in to the car. States that it feels like her patella "slips in and out". She is not taking pain medication.   C/O buckling. Denies catching, locking, numbness or tingling.

## 2024-04-17 NOTE — DISCUSSION/SUMMARY
[de-identified] : 62 y/o female with bilateral knee pain.   Presentation is consistent with early degenerative change and arthrosis to the knee. Described that this is likely due to overuse, and age-related "wear and tear". Pain may be related to breakdown of the chondral surfaces, cartilage, or ligaments of the knee.  Patient's main complaints are for instability.  I do not think there is any lili instability to the knee that is due to your ligamentous dysfunction, and I discussed that instability may come from proximal weakness and deconditioning.  Recommendation: Begin trial of PT, Rx given.  Activity to tolerance.  Ice/NSAIDs as needed.  Follow up as needed if pain persists for further evaluation and treatment.

## 2024-04-17 NOTE — ADDENDUM
[FreeTextEntry1] : This note was written by Leanne Dill on 04/16/2024 acting solely as a scribe for Dr. Forrest Sherwood.  All medical record entries made by the Scribe were at my, Dr. Forrest Sherwood, direction and personally dictated by me on 04/16/2024. I have personally reviewed the chart and agree that the record accurately reflects my personal performance of the history, physical exam, assessment and plan.

## 2024-04-17 NOTE — PHYSICAL EXAM
[de-identified] : Right Knee Exam:   Skin: Clean, dry, intact Inspection: No obvious malalignment, no masses, no swelling, no effusion Pulses: 2+ DP/PT pulses ROM: 0-135 degrees of flexion. No pain with deep knee flexion/extension. Tenderness: No MJLT. No LJLT. No pain over the patella facets. No pain to the quadriceps tendon. No pain to the patella tendon. No posterior knee tenderness. Stability: Stable to varus, valgus. Negative Lachman testing. Negative anterior drawer, negative posterior drawer. Strength: 5/5 Q/H/TA/GS/EHL, without atrophy Neuro: Intact to light touch throughout, DTRs normal Additional Tests: Negative Rojas's test, Negative patellar grind test Other findings: None.   Left Knee Exam:   Skin: Clean, dry, intact Inspection: No obvious malalignment, no masses, no swelling, no effusion Pulses: 2+ DP/PT pulses ROM: 0-135 degrees of flexion. No pain with deep knee flexion/extension. Tenderness: No MJLT. No LJLT. No pain over the patella facets. No pain to the quadriceps tendon. No pain to the patella tendon. No posterior knee tenderness. Stability: Stable to varus, valgus. Negative Lachman testing. Negative anterior drawer, negative posterior drawer. Strength: 5/5 Q/H/TA/GS/EHL, without atrophy Neuro: Intact to light touch throughout, DTRs normal Additional Tests: Negative Rojas's test, Negative patellar grind test Other findings: None. [de-identified] : The following radiographs were ordered and read by me during this patients visit. I reviewed each radiograph in detail with the patient and discussed the findings as highlighted below.   4 views of the bilateral knee were obtained today, 04/16/2024, that show no acute fracture or dislocation. There is no medial, no lateral and no patellofemoral degenerative changes seen. There is no significant malalignment. No significant other obvious osseous abnormality, otherwise unremarkable.

## 2024-04-24 ENCOUNTER — APPOINTMENT (OUTPATIENT)
Dept: ORTHOPEDIC SURGERY | Facility: CLINIC | Age: 64
End: 2024-04-24
Payer: MEDICARE

## 2024-04-24 VITALS
DIASTOLIC BLOOD PRESSURE: 103 MMHG | WEIGHT: 188 LBS | SYSTOLIC BLOOD PRESSURE: 161 MMHG | BODY MASS INDEX: 36.91 KG/M2 | HEIGHT: 60 IN | HEART RATE: 74 BPM

## 2024-04-24 VITALS — SYSTOLIC BLOOD PRESSURE: 161 MMHG | DIASTOLIC BLOOD PRESSURE: 82 MMHG

## 2024-04-24 DIAGNOSIS — M25.774 OSTEOPHYTE, RIGHT FOOT: ICD-10-CM

## 2024-04-24 DIAGNOSIS — R20.0 ANESTHESIA OF SKIN: ICD-10-CM

## 2024-04-24 DIAGNOSIS — R20.2 ANESTHESIA OF SKIN: ICD-10-CM

## 2024-04-24 DIAGNOSIS — M20.11 HALLUX VALGUS (ACQUIRED), RIGHT FOOT: ICD-10-CM

## 2024-04-24 DIAGNOSIS — M21.42 FLAT FOOT [PES PLANUS] (ACQUIRED), RIGHT FOOT: ICD-10-CM

## 2024-04-24 DIAGNOSIS — M62.89 OTHER SPECIFIED DISORDERS OF MUSCLE: ICD-10-CM

## 2024-04-24 DIAGNOSIS — M21.41 FLAT FOOT [PES PLANUS] (ACQUIRED), RIGHT FOOT: ICD-10-CM

## 2024-04-24 DIAGNOSIS — M20.12 HALLUX VALGUS (ACQUIRED), LEFT FOOT: ICD-10-CM

## 2024-04-24 PROCEDURE — 99214 OFFICE O/P EST MOD 30 MIN: CPT

## 2024-04-24 PROCEDURE — 73630 X-RAY EXAM OF FOOT: CPT | Mod: 50

## 2024-04-24 RX ORDER — DICLOFENAC SODIUM 1% 10 MG/G
1 GEL TOPICAL
Qty: 1 | Refills: 2 | Status: ACTIVE | COMMUNITY
Start: 2024-04-24 | End: 1900-01-01

## 2024-04-24 RX ORDER — DICLOFENAC SODIUM 1% 10 MG/G
1 GEL TOPICAL DAILY
Qty: 1 | Refills: 2 | Status: ACTIVE | COMMUNITY
Start: 2024-04-24 | End: 1900-01-01

## 2024-07-24 ENCOUNTER — RX RENEWAL (OUTPATIENT)
Age: 64
End: 2024-07-24

## 2024-07-30 ENCOUNTER — NON-APPOINTMENT (OUTPATIENT)
Age: 64
End: 2024-07-30

## 2024-07-30 ENCOUNTER — APPOINTMENT (OUTPATIENT)
Dept: CARDIOLOGY | Facility: CLINIC | Age: 64
End: 2024-07-30
Payer: MEDICARE

## 2024-07-30 VITALS
WEIGHT: 190 LBS | BODY MASS INDEX: 37.3 KG/M2 | SYSTOLIC BLOOD PRESSURE: 129 MMHG | HEART RATE: 80 BPM | HEIGHT: 60 IN | DIASTOLIC BLOOD PRESSURE: 81 MMHG | OXYGEN SATURATION: 100 %

## 2024-07-30 DIAGNOSIS — I25.10 ATHEROSCLEROTIC HEART DISEASE OF NATIVE CORONARY ARTERY W/OUT ANGINA PECTORIS: ICD-10-CM

## 2024-07-30 DIAGNOSIS — E78.5 HYPERLIPIDEMIA, UNSPECIFIED: ICD-10-CM

## 2024-07-30 PROCEDURE — 93000 ELECTROCARDIOGRAM COMPLETE: CPT

## 2024-07-30 PROCEDURE — 99214 OFFICE O/P EST MOD 30 MIN: CPT

## 2024-08-01 NOTE — HISTORY OF PRESENT ILLNESS
[FreeTextEntry1] : Rae returning for eval No CP No palps Feels well Getting labs this week with PCP

## 2024-08-01 NOTE — DISCUSSION/SUMMARY
[FreeTextEntry1] : CAD- [id number] with collaterals. Mod LAD dz without symptoms and normal IFR Labs pending  Encouraged exercise  [EKG obtained to assist in diagnosis and management of assessed problem(s)] : EKG obtained to assist in diagnosis and management of assessed problem(s)

## 2024-08-08 ENCOUNTER — APPOINTMENT (OUTPATIENT)
Dept: NEUROLOGY | Facility: CLINIC | Age: 64
End: 2024-08-08

## 2024-08-08 PROCEDURE — 95909 NRV CNDJ TST 5-6 STUDIES: CPT

## 2024-08-08 PROCEDURE — 95886 MUSC TEST DONE W/N TEST COMP: CPT

## 2024-08-15 ENCOUNTER — RESULT REVIEW (OUTPATIENT)
Age: 64
End: 2024-08-15

## 2024-08-15 DIAGNOSIS — G62.9 POLYNEUROPATHY, UNSPECIFIED: ICD-10-CM

## 2024-08-16 ENCOUNTER — APPOINTMENT (OUTPATIENT)
Dept: NUCLEAR MEDICINE | Facility: IMAGING CENTER | Age: 64
End: 2024-08-16
Payer: MEDICARE

## 2024-08-16 PROCEDURE — 78830 RP LOCLZJ TUM SPECT W/CT 1: CPT | Mod: 26

## 2024-08-19 LAB
ALBUMIN SERPL ELPH-MCNC: 4.5 G/DL
ALP BLD-CCNC: 233 U/L
ALT SERPL-CCNC: 22 U/L
ANION GAP SERPL CALC-SCNC: 15 MMOL/L
AST SERPL-CCNC: 27 U/L
BILIRUB SERPL-MCNC: 0.3 MG/DL
BUN SERPL-MCNC: 18 MG/DL
CALCIUM SERPL-MCNC: 10.7 MG/DL
CHLORIDE SERPL-SCNC: 99 MMOL/L
CO2 SERPL-SCNC: 26 MMOL/L
CREAT SERPL-MCNC: 1.03 MG/DL
EGFR: 60 ML/MIN/1.73M2
GLUCOSE SERPL-MCNC: 97 MG/DL
POTASSIUM SERPL-SCNC: 4.2 MMOL/L
PROT SERPL-MCNC: 7.9 G/DL
SODIUM SERPL-SCNC: 141 MMOL/L

## 2024-08-20 LAB
ALBUMIN MFR SERPL ELPH: 51 %
ALBUMIN SERPL-MCNC: 4 G/DL
ALBUMIN/GLOB SERPL: 1 RATIO
ALPHA1 GLOB MFR SERPL ELPH: 4.6 %
ALPHA1 GLOB SERPL ELPH-MCNC: 0.4 G/DL
ALPHA2 GLOB MFR SERPL ELPH: 10.4 %
ALPHA2 GLOB SERPL ELPH-MCNC: 0.8 G/DL
B-GLOBULIN MFR SERPL ELPH: 13.6 %
B-GLOBULIN SERPL ELPH-MCNC: 1.1 G/DL
DEPRECATED KAPPA LC FREE/LAMBDA SER: 1.21 RATIO
GAMMA GLOB FLD ELPH-MCNC: 1.6 G/DL
GAMMA GLOB MFR SERPL ELPH: 20.4 %
IGA SER QL IEP: 193 MG/DL
IGG SER QL IEP: 1637 MG/DL
IGM SER QL IEP: 98 MG/DL
INTERPRETATION SERPL IEP-IMP: NORMAL
KAPPA LC CSF-MCNC: 1.26 MG/DL
KAPPA LC SERPL-MCNC: 1.52 MG/DL
M PROTEIN SPEC IFE-MCNC: NORMAL
PROT SERPL-MCNC: 7.9 G/DL
PROT SERPL-MCNC: 7.9 G/DL

## 2024-09-11 ENCOUNTER — APPOINTMENT (OUTPATIENT)
Dept: NEUROLOGY | Facility: CLINIC | Age: 64
End: 2024-09-11

## 2024-10-16 ENCOUNTER — APPOINTMENT (OUTPATIENT)
Dept: NEUROLOGY | Facility: CLINIC | Age: 64
End: 2024-10-16
Payer: MEDICARE

## 2024-10-16 DIAGNOSIS — G62.9 POLYNEUROPATHY, UNSPECIFIED: ICD-10-CM

## 2024-10-16 PROCEDURE — 99213 OFFICE O/P EST LOW 20 MIN: CPT

## 2024-10-22 ENCOUNTER — RX RENEWAL (OUTPATIENT)
Age: 64
End: 2024-10-22

## 2025-02-25 ENCOUNTER — APPOINTMENT (OUTPATIENT)
Dept: CARDIOLOGY | Facility: CLINIC | Age: 65
End: 2025-02-25
Payer: MEDICARE

## 2025-02-25 VITALS
DIASTOLIC BLOOD PRESSURE: 77 MMHG | BODY MASS INDEX: 34.76 KG/M2 | SYSTOLIC BLOOD PRESSURE: 133 MMHG | OXYGEN SATURATION: 99 % | HEART RATE: 73 BPM | WEIGHT: 178 LBS

## 2025-02-25 DIAGNOSIS — I25.10 ATHEROSCLEROTIC HEART DISEASE OF NATIVE CORONARY ARTERY W/OUT ANGINA PECTORIS: ICD-10-CM

## 2025-02-25 DIAGNOSIS — E78.5 HYPERLIPIDEMIA, UNSPECIFIED: ICD-10-CM

## 2025-02-25 PROCEDURE — 93000 ELECTROCARDIOGRAM COMPLETE: CPT

## 2025-02-25 PROCEDURE — 99214 OFFICE O/P EST MOD 30 MIN: CPT

## 2025-03-03 ENCOUNTER — EMERGENCY (EMERGENCY)
Facility: HOSPITAL | Age: 65
LOS: 1 days | Discharge: HOME | End: 2025-03-03
Attending: EMERGENCY MEDICINE
Payer: MEDICARE

## 2025-03-03 VITALS
OXYGEN SATURATION: 100 % | HEIGHT: 60 IN | DIASTOLIC BLOOD PRESSURE: 95 MMHG | RESPIRATION RATE: 16 BRPM | SYSTOLIC BLOOD PRESSURE: 159 MMHG | WEIGHT: 175.93 LBS | HEART RATE: 90 BPM | TEMPERATURE: 99 F

## 2025-03-03 DIAGNOSIS — Z95.5 PRESENCE OF CORONARY ANGIOPLASTY IMPLANT AND GRAFT: Chronic | ICD-10-CM

## 2025-03-03 DIAGNOSIS — Z98.890 OTHER SPECIFIED POSTPROCEDURAL STATES: Chronic | ICD-10-CM

## 2025-03-03 LAB
ALBUMIN SERPL ELPH-MCNC: 4.1 G/DL — SIGNIFICANT CHANGE UP (ref 3.3–5)
ALP SERPL-CCNC: 74 U/L — SIGNIFICANT CHANGE UP (ref 40–120)
ALT FLD-CCNC: 20 U/L — SIGNIFICANT CHANGE UP (ref 10–45)
ANION GAP SERPL CALC-SCNC: 11 MMOL/L — SIGNIFICANT CHANGE UP (ref 5–17)
AST SERPL-CCNC: 22 U/L — SIGNIFICANT CHANGE UP (ref 10–40)
BASOPHILS # BLD AUTO: 0.05 K/UL — SIGNIFICANT CHANGE UP (ref 0–0.2)
BASOPHILS NFR BLD AUTO: 0.8 % — SIGNIFICANT CHANGE UP (ref 0–2)
BILIRUB SERPL-MCNC: 0.4 MG/DL — SIGNIFICANT CHANGE UP (ref 0.2–1.2)
BUN SERPL-MCNC: 8 MG/DL — SIGNIFICANT CHANGE UP (ref 7–23)
CALCIUM SERPL-MCNC: 9.7 MG/DL — SIGNIFICANT CHANGE UP (ref 8.4–10.5)
CHLORIDE SERPL-SCNC: 104 MMOL/L — SIGNIFICANT CHANGE UP (ref 96–108)
CO2 SERPL-SCNC: 28 MMOL/L — SIGNIFICANT CHANGE UP (ref 22–31)
CREAT SERPL-MCNC: 0.88 MG/DL — SIGNIFICANT CHANGE UP (ref 0.5–1.3)
D DIMER BLD IA.RAPID-MCNC: 230 NG/ML DDU — HIGH
EGFR: 72 ML/MIN/1.73M2 — SIGNIFICANT CHANGE UP
EOSINOPHIL # BLD AUTO: 0.07 K/UL — SIGNIFICANT CHANGE UP (ref 0–0.5)
EOSINOPHIL NFR BLD AUTO: 1.1 % — SIGNIFICANT CHANGE UP (ref 0–6)
GLUCOSE SERPL-MCNC: 109 MG/DL — HIGH (ref 70–99)
HCT VFR BLD CALC: 36.2 % — SIGNIFICANT CHANGE UP (ref 34.5–45)
HGB BLD-MCNC: 11.9 G/DL — SIGNIFICANT CHANGE UP (ref 11.5–15.5)
IMM GRANULOCYTES NFR BLD AUTO: 0.2 % — SIGNIFICANT CHANGE UP (ref 0–0.9)
LIDOCAIN IGE QN: 53 U/L — SIGNIFICANT CHANGE UP (ref 7–60)
LYMPHOCYTES # BLD AUTO: 2.24 K/UL — SIGNIFICANT CHANGE UP (ref 1–3.3)
LYMPHOCYTES # BLD AUTO: 33.6 % — SIGNIFICANT CHANGE UP (ref 13–44)
MCHC RBC-ENTMCNC: 28.5 PG — SIGNIFICANT CHANGE UP (ref 27–34)
MCHC RBC-ENTMCNC: 32.9 G/DL — SIGNIFICANT CHANGE UP (ref 32–36)
MCV RBC AUTO: 86.6 FL — SIGNIFICANT CHANGE UP (ref 80–100)
MONOCYTES # BLD AUTO: 0.42 K/UL — SIGNIFICANT CHANGE UP (ref 0–0.9)
MONOCYTES NFR BLD AUTO: 6.3 % — SIGNIFICANT CHANGE UP (ref 2–14)
NEUTROPHILS # BLD AUTO: 3.87 K/UL — SIGNIFICANT CHANGE UP (ref 1.8–7.4)
NEUTROPHILS NFR BLD AUTO: 58 % — SIGNIFICANT CHANGE UP (ref 43–77)
NRBC BLD AUTO-RTO: 0 /100 WBCS — SIGNIFICANT CHANGE UP (ref 0–0)
NT-PROBNP SERPL-SCNC: 56 PG/ML — SIGNIFICANT CHANGE UP (ref 0–300)
PLATELET # BLD AUTO: 396 K/UL — SIGNIFICANT CHANGE UP (ref 150–400)
POTASSIUM SERPL-MCNC: 3.7 MMOL/L — SIGNIFICANT CHANGE UP (ref 3.5–5.3)
POTASSIUM SERPL-SCNC: 3.7 MMOL/L — SIGNIFICANT CHANGE UP (ref 3.5–5.3)
PROT SERPL-MCNC: 8.1 G/DL — SIGNIFICANT CHANGE UP (ref 6–8.3)
RBC # BLD: 4.18 M/UL — SIGNIFICANT CHANGE UP (ref 3.8–5.2)
RBC # FLD: 14.1 % — SIGNIFICANT CHANGE UP (ref 10.3–14.5)
SODIUM SERPL-SCNC: 143 MMOL/L — SIGNIFICANT CHANGE UP (ref 135–145)
TROPONIN T, HIGH SENSITIVITY RESULT: <6 NG/L — SIGNIFICANT CHANGE UP (ref 0–51)
TROPONIN T, HIGH SENSITIVITY RESULT: <6 NG/L — SIGNIFICANT CHANGE UP (ref 0–51)
WBC # BLD: 6.66 K/UL — SIGNIFICANT CHANGE UP (ref 3.8–10.5)
WBC # FLD AUTO: 6.66 K/UL — SIGNIFICANT CHANGE UP (ref 3.8–10.5)

## 2025-03-03 PROCEDURE — 99285 EMERGENCY DEPT VISIT HI MDM: CPT | Mod: GC

## 2025-03-03 PROCEDURE — 71045 X-RAY EXAM CHEST 1 VIEW: CPT | Mod: 26

## 2025-03-03 PROCEDURE — 99223 1ST HOSP IP/OBS HIGH 75: CPT | Mod: FS

## 2025-03-03 RX ORDER — ROSUVASTATIN CALCIUM 20 MG/1
20 TABLET, FILM COATED ORAL AT BEDTIME
Refills: 0 | Status: ACTIVE | OUTPATIENT
Start: 2025-03-03 | End: 2026-01-30

## 2025-03-03 RX ORDER — LETROZOLE 2.5 MG/1
2.5 TABLET, FILM COATED ORAL DAILY
Refills: 0 | Status: ACTIVE | OUTPATIENT
Start: 2025-03-03 | End: 2026-01-30

## 2025-03-03 RX ORDER — ASPIRIN 325 MG
81 TABLET ORAL DAILY
Refills: 0 | Status: ACTIVE | OUTPATIENT
Start: 2025-03-03 | End: 2026-01-30

## 2025-03-03 RX ORDER — LISINOPRIL 5 MG/1
40 TABLET ORAL DAILY
Refills: 0 | Status: ACTIVE | OUTPATIENT
Start: 2025-03-03 | End: 2026-01-30

## 2025-03-03 RX ORDER — ASPIRIN 325 MG
324 TABLET ORAL ONCE
Refills: 0 | Status: COMPLETED | OUTPATIENT
Start: 2025-03-03 | End: 2025-03-03

## 2025-03-03 RX ADMIN — LETROZOLE 2.5 MILLIGRAM(S): 2.5 TABLET, FILM COATED ORAL at 22:53

## 2025-03-03 RX ADMIN — ROSUVASTATIN CALCIUM 20 MILLIGRAM(S): 20 TABLET, FILM COATED ORAL at 22:53

## 2025-03-03 RX ADMIN — LISINOPRIL 40 MILLIGRAM(S): 5 TABLET ORAL at 22:53

## 2025-03-03 RX ADMIN — Medication 324 MILLIGRAM(S): at 15:54

## 2025-03-03 RX ADMIN — Medication 20 MILLIGRAM(S): at 19:52

## 2025-03-03 NOTE — CONSULT NOTE ADULT - SUBJECTIVE AND OBJECTIVE BOX
63 y/o female with PMHx of CAD s/p (TOBY to LAD and D1,  stent to D1 is 100% occluded), hypertension, hyperlipidemia, who presents with epigsatric tightness.      PAST MEDICAL & SURGICAL HISTORY:  HTN (hypertension)  HLD (hyperlipidemia)  CAD (coronary artery disease)  Benign breast lumps  H/O:   H/O: hysterectomy  Stented coronary artery  S/P bilateral breast lumpectomy    FAMILY HISTORY: relevant information in HPI    SOCIAL HISTORY:    - no tobacco, rare ETOH, no drug use  - retired    MEDICATIONS:  aspirin  chewable 324 milliGRAM(s) Oral once    HOME MEDS:  - aspirin 81  - rosuvastain 20  - lisinopril 40  - vitamin D 2000 unit dailt  - letrozole 2.5 mg    -------------------------------------------------------------------------------------------  PHYSICAL EXAM:  T(C): 37.3 (25 @ 12:53), Max: 37.3 (25 @ 12:53)  HR: 90 (25 @ 12:53) (90 - 90)  BP: 159/95 (25 @ 12:53) (159/95 - 159/95)  RR: 16 (25 @ 12:53) (16 - 16)  SpO2: 100% (25 @ 12:53) (100% - 100%)    GENERAL: no acute distress  NECK: JVP is NOT elevated  CHEST/LUNG: clear to auscultation bilaterally, unlabored breathing  HEART: regular rate and rhythm, no murmurs  ABDOMEN: soft, non-tender, non-distended  EXTREMITIES:  warm, no LE edema    -------------------------------------------------------------------------------------------  RELEVANT LABS:    trop <6  proBNP 56    Cr 0.88    -------------------------------------------------------------------------------------------  RELEVANT IMAGING:    CXR: clear lungs    ECG: sinus rhythm with RBBB (similar to prior)    Echo:     2012:  Conclusions:  1. Mild mitral regurgitation.  2.  Minimal aortic regurgitation.  3. Normal left ventricular systolic function. No segmental  wall motion abnormalities.  4. Normal right ventricular systolic function.  5. Estimated right ventricular systolic pressure equals 20  mm Hg, assuming right atrial pressure equals 10 mm Hg,  consistent with normal pulmonary pressures. Incomplete  tricuspid regurgitation Doppler envelopes in this study may  underestimate RVSP.  6. Normal tricuspid valve. Mild tricuspid regurgitation.    Stress Testing:  Conclusions:   1. Myocardial Perfusion: Abnormal.   2. The patient underwent stress testing using the standard Lenny protocol.      _ The patient exercised for 8 min 59 sec.      _ The test was stopped due to fatigue.      _ The patient achieved 10.1 METS which is consistent with good exercise capacity.   3. Baseline electrocardiogram: normal sinus rhythm at a rate of 72 bpm with right bundle branch block and nonspecific ST-T wave abnormalities.   4. Stress electrocardiogram: There were approximately 1.5 mm horizontal ST segment depressions in leads V3-V6 starting at 3:00 min of exercise at 118 bpm and persisting 1:50 min of recovery. At 3:50 min of recovery at 93 bpm, approximately 0.5 mm horizontal ST segment depressions developed inthese leads and persisted at least 4:50 min in recovery.   5. Perfusion Findings:      --There are large, mild to moderate defects in the anterior, anteroseptal, anterolateral, and apical walls that are mostly reversible suggestive of infarct with significant marcos-infarct ischemia.      --There are large, mild to moderate defects in the inferior, basal inferolateral, and basal inferoseptal walls that are partially reversible suggestive of infarct with at least moderate marcos-infarct ischemia.   6.The left ventricle is normal in size. The left ventricular EF% during stress is 69 %.   7. Post-stress gated wall motion analysis revealed hypokinesis of the basal anteroseptal wall and reduced systolic thickening of the anterior, anterolateral, mid to distal anteroseptal, apical, inferior, basal inferolateral, and basal inferoseptal walls.    Cath:    :  LM   Left main artery: The segment is visually normal in size and  structure.    LAD   Proximal left anterior descending: There is a 50 % stenosis within the  stented segment. Instant wave-freeratio was performed  with a calculated value of 0.91. Based on the results of this study,  the lesion was judged to be non-significant and no  intervention was performed. First diagonal: There is a 100 % stenosis  within the stented segment. Second diagonal: The  segment is visually normal in size and structure.      CX   Circumflex: Angiography shows minor irregularities. First obtuse  marginal: Angiography shows minor irregularities.    RCA   Right coronary artery: Angiography shows mild atherosclerosis.      Left Heart Cath   [facility] performed: Aortic valve crossed and left ventricular pressures  were obtained.      Diagnostic Conclusions:     Occluded D1 stent - filling via D2 collaterals. Mild-moderate LAD  stent restenosis with normal IFR. Minimal Lcx and RCA  disease.   Recommendations:     Medical therapy.       -------------------------------------------------------------------------------------------                 63 y/o female with PMHx of CAD s/p (TOBY to LAD and D1,  stent to D1 is 100% occluded), hypertension, hyperlipidemia, who presents with epigsatric tightness.    Around 4:30 am this morning, when patient woke up and went to use the bathroom, she noticed a tightness sensation in the epigastric region. Pain was described as ~5/10 in intensity. Non-radiating. No associated shortness of breath, diaphoresis, palpitations, nausea/vomiting, lightheadedness of loss of consciousness. She also denies any recent leg swelling, orthopnea or PND. She was able to go back to sleep, and woke up again around ~7 am. When she tried ambulating again, she felt a similar epigastric discomfort. Given her prior cardiac history, she presented to the ED for further work-up.    PAST MEDICAL & SURGICAL HISTORY:  HTN (hypertension)  HLD (hyperlipidemia)  CAD (coronary artery disease)  Benign breast lumps  H/O:   H/O: hysterectomy  Stented coronary artery  S/P bilateral breast lumpectomy    FAMILY HISTORY: relevant information in HPI    SOCIAL HISTORY:    - no tobacco, rare ETOH, no drug use  - retired    MEDICATIONS:  aspirin  chewable 324 milliGRAM(s) Oral once    HOME MEDS:  - aspirin 81  - rosuvastain 20  - lisinopril 40  - vitamin D 2000 unit dailt  - letrozole 2.5 mg    -------------------------------------------------------------------------------------------  PHYSICAL EXAM:  T(C): 37.3 (25 @ 12:53), Max: 37.3 (25 @ 12:53)  HR: 90 (25 @ 12:53) (90 - 90)  BP: 159/95 (25 @ 12:53) (159/95 - 159/95)  RR: 16 (25 @ 12:53) (16 - 16)  SpO2: 100% (25 @ 12:53) (100% - 100%)    GENERAL: no acute distress  NECK: JVP is NOT elevated  CHEST/LUNG: clear to auscultation bilaterally, unlabored breathing  HEART: regular rate and rhythm, no murmurs  ABDOMEN: soft, non-tender, non-distended  EXTREMITIES:  warm, no LE edema    -------------------------------------------------------------------------------------------  RELEVANT LABS:    trop <6  proBNP 56    Cr 0.88    -------------------------------------------------------------------------------------------  RELEVANT IMAGING:    CXR: clear lungs    ECG: sinus rhythm with RBBB (similar to prior)    Echo:     2012:  Conclusions:  1. Mild mitral regurgitation.  2.  Minimal aortic regurgitation.  3. Normal left ventricular systolic function. No segmental  wall motion abnormalities.  4. Normal right ventricular systolic function.  5. Estimated right ventricular systolic pressure equals 20  mm Hg, assuming right atrial pressure equals 10 mm Hg,  consistent with normal pulmonary pressures. Incomplete  tricuspid regurgitation Doppler envelopes in this study may  underestimate RVSP.  6. Normal tricuspid valve. Mild tricuspid regurgitation.    Stress Testing:  Conclusions:   1. Myocardial Perfusion: Abnormal.   2. The patient underwent stress testing using the standard Lenny protocol.      _ The patient exercised for 8 min 59 sec.      _ The test was stopped due to fatigue.      _ The patient achieved 10.1 METS which is consistent with good exercise capacity.   3. Baseline electrocardiogram: normal sinus rhythm at a rate of 72 bpm with right bundle branch block and nonspecific ST-T wave abnormalities.   4. Stress electrocardiogram: There were approximately 1.5 mm horizontal ST segment depressions in leads V3-V6 starting at 3:00 min of exercise at 118 bpm and persisting 1:50 min of recovery. At 3:50 min of recovery at 93 bpm, approximately 0.5 mm horizontal ST segment depressions developed inthese leads and persisted at least 4:50 min in recovery.   5. Perfusion Findings:      --There are large, mild to moderate defects in the anterior, anteroseptal, anterolateral, and apical walls that are mostly reversible suggestive of infarct with significant marcos-infarct ischemia.      --There are large, mild to moderate defects in the inferior, basal inferolateral, and basal inferoseptal walls that are partially reversible suggestive of infarct with at least moderate marcos-infarct ischemia.   6.The left ventricle is normal in size. The left ventricular EF% during stress is 69 %.   7. Post-stress gated wall motion analysis revealed hypokinesis of the basal anteroseptal wall and reduced systolic thickening of the anterior, anterolateral, mid to distal anteroseptal, apical, inferior, basal inferolateral, and basal inferoseptal walls.    Cath:    :  LM   Left main artery: The segment is visually normal in size and  structure.    LAD   Proximal left anterior descending: There is a 50 % stenosis within the  stented segment. Instant wave-freeratio was performed  with a calculated value of 0.91. Based on the results of this study,  the lesion was judged to be non-significant and no  intervention was performed. First diagonal: There is a 100 % stenosis  within the stented segment. Second diagonal: The  segment is visually normal in size and structure.      CX   Circumflex: Angiography shows minor irregularities. First obtuse  marginal: Angiography shows minor irregularities.    RCA   Right coronary artery: Angiography shows mild atherosclerosis.      Left Heart Cath   [facility] performed: Aortic valve crossed and left ventricular pressures  were obtained.      Diagnostic Conclusions:     Occluded D1 stent - filling via D2 collaterals. Mild-moderate LAD  stent restenosis with normal IFR. Minimal Lcx and RCA  disease.   Recommendations:     Medical therapy.       -------------------------------------------------------------------------------------------                 63 y/o female with PMHx of CAD s/p (TOBY to LAD and D1,  stent to D1 is 100% occluded), hypertension, hyperlipidemia, who presents with epigsatric tightness.    Around 4:30 am this morning, when patient woke up and went to use the bathroom, she noticed a tightness sensation in the epigastric region. Pain was described as ~5/10 in intensity. Non-radiating. No associated shortness of breath, diaphoresis, palpitations, nausea/vomiting, lightheadedness of loss of consciousness. She also denies any recent leg swelling, orthopnea or PND. She was able to go back to sleep, and woke up again around ~7 am. When she tried ambulating again, she felt a similar epigastric discomfort. Given her prior cardiac history, she presented to the ED for further work-up.    No recent sick contacts. No travel. No changes in diet. Adherent to all her medications daily.    PAST MEDICAL & SURGICAL HISTORY:  HTN (hypertension)  HLD (hyperlipidemia)  CAD (coronary artery disease)  Benign breast lumps  H/O:   H/O: hysterectomy  Stented coronary artery  S/P bilateral breast lumpectomy    FAMILY HISTORY: relevant information in HPI    SOCIAL HISTORY:    - no tobacco, rare ETOH, no drug use  - retired    MEDICATIONS:  aspirin  chewable 324 milliGRAM(s) Oral once    HOME MEDS:  - aspirin 81  - rosuvastain 20  - lisinopril 40  - vitamin D 2000 unit dailt  - letrozole 2.5 mg    -------------------------------------------------------------------------------------------  PHYSICAL EXAM:  T(C): 37.3 (25 @ 12:53), Max: 37.3 (25 @ 12:53)  HR: 90 (25 @ 12:53) (90 - 90)  BP: 159/95 (25 @ 12:53) (159/95 - 159/95)  RR: 16 (25 @ 12:53) (16 - 16)  SpO2: 100% (25 @ 12:53) (100% - 100%)    GENERAL: no acute distress  NECK: JVP is NOT elevated  CHEST/LUNG: clear to auscultation bilaterally, unlabored breathing  HEART: regular rate and rhythm, no murmurs  ABDOMEN: soft, non-tender, non-distended  EXTREMITIES:  warm, no LE edema    -------------------------------------------------------------------------------------------  RELEVANT LABS:    trop <6  proBNP 56    Cr 0.88    -------------------------------------------------------------------------------------------  RELEVANT IMAGING:    CXR: clear lungs    ECG: sinus rhythm with RBBB (similar to prior)    Echo:     2012:  Conclusions:  1. Mild mitral regurgitation.  2.  Minimal aortic regurgitation.  3. Normal left ventricular systolic function. No segmental  wall motion abnormalities.  4. Normal right ventricular systolic function.  5. Estimated right ventricular systolic pressure equals 20  mm Hg, assuming right atrial pressure equals 10 mm Hg,  consistent with normal pulmonary pressures. Incomplete  tricuspid regurgitation Doppler envelopes in this study may  underestimate RVSP.  6. Normal tricuspid valve. Mild tricuspid regurgitation.    Stress Testing:  Conclusions:   1. Myocardial Perfusion: Abnormal.   2. The patient underwent stress testing using the standard Lenny protocol.      _ The patient exercised for 8 min 59 sec.      _ The test was stopped due to fatigue.      _ The patient achieved 10.1 METS which is consistent with good exercise capacity.   3. Baseline electrocardiogram: normal sinus rhythm at a rate of 72 bpm with right bundle branch block and nonspecific ST-T wave abnormalities.   4. Stress electrocardiogram: There were approximately 1.5 mm horizontal ST segment depressions in leads V3-V6 starting at 3:00 min of exercise at 118 bpm and persisting 1:50 min of recovery. At 3:50 min of recovery at 93 bpm, approximately 0.5 mm horizontal ST segment depressions developed inthese leads and persisted at least 4:50 min in recovery.   5. Perfusion Findings:      --There are large, mild to moderate defects in the anterior, anteroseptal, anterolateral, and apical walls that are mostly reversible suggestive of infarct with significant marcos-infarct ischemia.      --There are large, mild to moderate defects in the inferior, basal inferolateral, and basal inferoseptal walls that are partially reversible suggestive of infarct with at least moderate marcos-infarct ischemia.   6.The left ventricle is normal in size. The left ventricular EF% during stress is 69 %.   7. Post-stress gated wall motion analysis revealed hypokinesis of the basal anteroseptal wall and reduced systolic thickening of the anterior, anterolateral, mid to distal anteroseptal, apical, inferior, basal inferolateral, and basal inferoseptal walls.    Cath:    :  LM   Left main artery: The segment is visually normal in size and  structure.    LAD   Proximal left anterior descending: There is a 50 % stenosis within the  stented segment. Instant wave-freeratio was performed  with a calculated value of 0.91. Based on the results of this study,  the lesion was judged to be non-significant and no  intervention was performed. First diagonal: There is a 100 % stenosis  within the stented segment. Second diagonal: The  segment is visually normal in size and structure.      CX   Circumflex: Angiography shows minor irregularities. First obtuse  marginal: Angiography shows minor irregularities.    RCA   Right coronary artery: Angiography shows mild atherosclerosis.      Left Heart Cath   [facility] performed: Aortic valve crossed and left ventricular pressures  were obtained.      Diagnostic Conclusions:     Occluded D1 stent - filling via D2 collaterals. Mild-moderate LAD  stent restenosis with normal IFR. Minimal Lcx and RCA  disease.   Recommendations:     Medical therapy.       -------------------------------------------------------------------------------------------

## 2025-03-03 NOTE — ED PROVIDER NOTE - CLINICAL SUMMARY MEDICAL DECISION MAKING FREE TEXT BOX
Attending note.  Patient was seen in [facility] by room #42.  Patient has a history of CAD, hypertension hyperlipidemia.  Patient had chest pain and 2 coronary stents placed in 2010.  Patient had a nuclear stress test in 2023 which showed occlusion of one of the stents.  Patient typically walks 1 to 2 miles few times a week without symptoms or decrease in exertional capacity.  Patient woke at 4 AM this morning with substernal chest pain without radiation or other associated symptoms.  Patient states symptoms are similar to symptoms she had which led to coronary stenting in 2010.  Patient currently reports symptoms as a dull ache.  There is no positional component.  She denies any recent travel.  She is currently taking rosuvastatin, lisinopril and aspirin only.  Plavix had been discontinued.       ROS-as above, otherwise negative.  PE-patient is alert no acute distress.  Skin is warm and dry.  Lungs are clear and equal bilaterally.  No wheezes rales or rhonchi.  Heart is regular rate and rhythm.  Abdomen is obese, soft and nontender.  There is no extremity edema.     A/P-patient with known coronary artery disease with 2 prior stents with 1 found to be occluded in 2023 and now with substernal chest heaviness.  Concern for occlusion of stent or other vessels.  Labs, troponin, chest x-ray, cardiology consultation.  Admission or CDU for further evaluation of coronary arteries.  Aspirin.

## 2025-03-03 NOTE — ED CDU PROVIDER INITIAL DAY NOTE - OBJECTIVE STATEMENT
64 female past medical history CAD status post 2 stents on aspirin, hypertension, hyperlipidemia, hysterectomy presenting with chest pressure.  Patient says that she woke up with the chest pressure at 4:30 AM.  The pain has been constant and is located substernally as well as in the left chest, does not radiate, no associated shortness of breath, diaphoresis, nausea. Says that this pain feels similar to when she had her 2 stents placed 10 years ago.  Cardiologist is Dr. Leonard.  Had a cath done 2 years ago, which showed that one of the stents had closed.  Denies fever, abdominal pain, recent travel, lower extremity swelling.  Has not taken her aspirin today.  In ED, patient had ekg no signs of acute ischemia, troponin x 2 negative, chest x ray no signs of acute pathology. Pt was evaluated by Cards and sent to CDU for frequent reeval, vitals q 4hrs, telemetry monitoring and TTE.

## 2025-03-03 NOTE — ED CDU PROVIDER DISPOSITION NOTE - ATTENDING APP SHARED VISIT CONTRIBUTION OF CARE
I personally have seen and examined this patient.  Physician assistant note reviewed and agree on plan of care and except where noted. Patient re-evaluated and doing well.  No acute issues at  this time.  Lab and radiology tests reviewed with patient and/or family.  Patient stable for discharge seen by cards thought to not be cardiac chest pain, echo requested, stable trops can likely be dc with outpt follow up with nl echo.

## 2025-03-03 NOTE — ED PROVIDER NOTE - PROGRESS NOTE DETAILS
(Mary Hutton MD-PGY1): Pt signed out to me. Basically high risk chest pain with negative trop and EKG. Cards saw pt and recs for CDU obs if bed available, otherwise medicine admit. Pt had occlusion of stent in 2023 and is being observed for further symptoms and for TTE in AM. Case discussed with CDU (Mary Hutton MD-PGY1): Pt transferred to CDU.

## 2025-03-03 NOTE — ED PROVIDER NOTE - PHYSICAL EXAMINATION
PHYSICAL EXAM:  GENERAL: Sitting comfortable in bed, in no acute distress  HENMT: Atraumatic, moist mucous membranes  HEART: Regular rate   RESPIRATORY: Clear to auscultation bilaterally, no wheezes/rhonchi/rales  ABDOMEN: Soft, nontender, nondistended  EXTREMITIES: No lower extremity edema  NEURO: Alert, follows commands, moving all extremities symmetrically

## 2025-03-03 NOTE — ED PROVIDER NOTE - OBJECTIVE STATEMENT
64 female past medical history CAD status post 2 stents on aspirin, hypertension, hyperlipidemia, hysterectomy presenting with chest pressure.  Patient says that she woke up with the chest pressure at 4:30 AM.  The pain has been constant and is located substernally as well as in the left chest, does not radiate, no associated shortness of breath, diaphoresis, nausea. Says that this pain feels similar to when she had her 2 stents placed 10 years ago.  Cardiologist is Dr. Leonard.  Had a cath done 2 years ago, which showed that one of the stents had closed.  Denies fever, abdominal pain, recent travel, lower extremity swelling.  Has not taken her aspirin today.

## 2025-03-03 NOTE — CONSULT NOTE ADULT - ATTENDING COMMENTS
64 year old woman with coronary stents, mild LAD in-stent stenosis, occluded diagonal stent presents with prolonged epigastric pain, initially observed when woke at 4:30 AM, returned to sleep and continued to observe when woke few hours later. Came to ER with pain persisting. No EKG evidence for ischemia, troponin < 6. Anticipate CDU monitoring, obtain cardiac echo (none found since 2012), does not seem to be ACS.    To contact call Cardiology Fellow or Attending as listed on [email or web address] password: [email or web address].

## 2025-03-03 NOTE — ED ADULT NURSE REASSESSMENT NOTE - NS ED NURSE REASSESS COMMENT FT1
Pt received from RICHIE Phelps at 2200. Pt oriented to CDU and plan of care was discussed. Pt is observed for CP, here for tele, TTE. A&Ox4, obeys commands, ambulatory, independent. Respirations spontaneous and unlabored. Denies SOB, dyspnea, cough, CP, palpitations at this time. On CM, NSR. IV site patent, no signs of infiltration noted. Denies N/V/D/C. Pt afebrile, denies chills. Pt resting in bed. Safety & comfort measures maintained. Call bell within reach.

## 2025-03-03 NOTE — ED CDU PROVIDER DISPOSITION NOTE - PATIENT PORTAL LINK FT
You can access the FollowMyHealth Patient Portal offered by [facility] by registering at the following website: [email or web address]. By joining [employer]’s FollowMyHealth portal, you will also be able to view your health information using other applications (apps) compatible with our system.

## 2025-03-03 NOTE — ED ADULT TRIAGE NOTE - HEIGHT IN FEET
"Pt calm and cooperate when approached for a 1:1 check in. Her goal is to\" Try to keep calm and not loose her mind\".  Pt is unaware when she will be discharging, states she has court May 30th and may have to go to treatment(does not want to) . Denies SI/SIB, reports being agitation due to missing significant events  such as mother's Day and an upcoming wedding. Pt would like to meet with her , states she's confused about going to court.      05/25/17 1200   Behavioral Health   Hallucinations denies / not responding to hallucinations   Thinking distractable;poor concentration   Orientation person: oriented;place: oriented;date: oriented;time: oriented   Memory baseline memory   Insight poor   Judgement impaired   Eye Contact at examiner   Affect tense;full range affect   Mood mood is calm   Physical Appearance/Attire attire appropriate to age and situation   Hygiene well groomed   Suicidality other (see comments)  (Pt denies.)   Self Injury other (see comment)  (Pt denies.)   Activity other (see comment)  (Visible in milieu coloring and social with others.)   Speech clear;coherent   Medication Sensitivity no stated side effects;no observed side effects   Psychomotor / Gait balanced;steady   Psycho Education   Type of Intervention 1:1 intervention   Response participates, initiates socially appropriate   Hours 0.5   Treatment Detail (1:1 check in.)   Activities of Daily Living   Hygiene/Grooming independent   Oral Hygiene independent   Dress independent   Laundry with supervision   Room Organization independent   Activity   Activity Level of Assistance independent     " 5

## 2025-03-03 NOTE — ED CDU PROVIDER DISPOSITION NOTE - CLINICAL COURSE
64 female past medical history CAD status post 2 stents on aspirin, hypertension, hyperlipidemia, hysterectomy presenting with chest pressure.  Patient says that she woke up with the chest pressure at 4:30 AM.  The pain has been constant and is located substernally as well as in the left chest, does not radiate, no associated shortness of breath, diaphoresis, nausea. Says that this pain feels similar to when she had her 2 stents placed 10 years ago.  Cardiologist is Dr. Leonard.  Had a cath done 2 years ago, which showed that one of the stents had closed.  Denies fever, abdominal pain, recent travel, lower extremity swelling.  Has not taken her aspirin today.  In ED, patient had ekg no signs of acute ischemia, troponin x 2 negative, chest x ray no signs of acute pathology. Pt was evaluated by Cards and sent to CDU for frequent reeval, vitals q 4hrs, telemetry monitoring and TTE. 64 female past medical history CAD status post 2 stents on aspirin, hypertension, hyperlipidemia, hysterectomy presenting with chest pressure.  Patient says that she woke up with the chest pressure at 4:30 AM.  The pain has been constant and is located substernally as well as in the left chest, does not radiate, no associated shortness of breath, diaphoresis, nausea. Says that this pain feels similar to when she had her 2 stents placed 10 years ago.  Cardiologist is Dr. Leonard.  Had a cath done 2 years ago, which showed that one of the stents had closed.  Denies fever, abdominal pain, recent travel, lower extremity swelling.  Has not taken her aspirin today.  In ED, patient had ekg no signs of acute ischemia, troponin x 2 negative, chest x ray no signs of acute pathology. Pt was evaluated by Cards and sent to CDU for frequent reeval, vitals q 4hrs, telemetry monitoring and TTE. In CDU, pt did well. no events on tele. TTE normal. pt seen by Cards attending- does not believe chest pain to be cardiac in nature and recommending outpatient f/up with pt's Cardiologist. No further cardiac testing to be done here.

## 2025-03-03 NOTE — ED PROVIDER NOTE - RAPID ASSESSMENT
Dr. Hinojosa: Patient was rapidly assessed by me as the Quick Triage Doctor; a limited history, physical exam and assessment was performed. The patient will be seen and further evaluated in the main emergency department. The remainder of care and evaluation will be conducted by the primary emergency medicine team. Receiving team will follow up on labs, imaging and serially reassess patient as indicated. All further decisions regarding patient care, evaluation and disposition are at the discretion of the receiving primary emergency department team.    Dr. Hinojosa: 64-year-old female history of hypertension, hyperlipidemia, 2 cardiac stents placed in 2010, one of them was blocked in 2023, not opened as per pt, follows up with cardiologist Dr. Leonard, presenting with 1 day of substernal chest pressure which feels similar to the chest pain that she had when she needed stents in the past.  Did not take her aspirin Plavix today.  Pain is mildly pleuritic as well as pressure like, no shortness of breath, EKG shows right bundle branch block which she has had in the past.  No travel, no smoking, however patient is on hormone therapy for 2 lumps that were removed from her breast.  Patient does not believe they were malignant.

## 2025-03-03 NOTE — ED ADULT NURSE REASSESSMENT NOTE - NS ED NURSE REASSESS COMMENT FT1
Report taken from RICHIE Chaudhry. Pt received sitting in bed. A&O x 4. Reports mild chest pressure. Breathing even and unlabored. Gross motor and neuro intact. NSR on CM. Awaiting dispo. Safety and comfort provided.

## 2025-03-03 NOTE — ED CDU PROVIDER INITIAL DAY NOTE - DETAILS
64 female past medical history  CAD s/p (TOBY to LAD and D1,  stent to D1 is 100% occluded) on aspirin, hypertension, hyperlipidemia, hysterectomy presenting with chest pressure.  Plan: frequent reeval, vitals q 4hrs, telemetry monitoring and TTE.

## 2025-03-03 NOTE — CONSULT NOTE ADULT - ASSESSMENT
65 y/o female with PMHx of CAD s/p (TOBY to LAD and D1,  stent to D1 is 100% occluded), hypertension, hyperlipidemia, who presents with epigsatric tightness.     - Epigastric pain  - CAD s/p TOBY to LAD and D1    Unclear etiology of symptoms. Differential includes GERD +/- gastritis + peptic ulcer disease. Pancreatitis also on differential. Low suspicion for cardiac etiology, but given her prior cardiac disease, should rule out acute coronary syndrome.     Cath in 2023 showed D1 stent is 100% occluded, and LAD stent has ~50% in-stent restenosis. IFR 0.91. Medical therapy was recommended.    RECOMMENDATIONS:  - can be monitored in CDU for overnight  - repeat troponin and check lipase  - resume home ASA + rosuvastatin + lisinopril + HCTZ  - obtain TTE  - trial a dose of PPI +/- maalox        Chris Escalera (PGY 4)  Cardiology Fellow

## 2025-03-03 NOTE — ED CDU PROVIDER DISPOSITION NOTE - NSFOLLOWUPINSTRUCTIONS_ED_ALL_ED_FT
1) Follow-up with your Primary Medical Doctor or referred doctor. Call today / next business day for prompt follow-up.  2) Return to Emergency room for any worsening or persistent pain, weakness, fever, or any other concerning symptoms.  3) See attached instruction sheets for additional information, including information regarding signs and symptoms to look out for, reasons to seek immediate care and other important instructions.  4) Follow-up with any specialists as discussed / noted as well. 1. Stay hydrated.  2. Continue Current Home Medications. Can try Pepcid 20mg or omeprazole (over the counter) daily to see if helps.   3. Follow up with your PCP in 1-2 days. Please follow up with your Cardiologist Dr. Leonard within 1 week, can discuss further outpatient cardiac testing like angiogram with Dr. Leonard. (Bring printed results to your doctor visit).  4. Return if symptoms, worsen, fever, weakness, difficulty breathing, dizziness and all other concerns.    Please review all results with your doctors.    Nonspecific Chest Pain, Adult  Chest pain is an uncomfortable, tight, or painful feeling in the chest. The pain can feel like a crushing, aching, or squeezing pressure. A person can feel a burning or tingling sensation. Chest pain can also be felt in your back, neck, jaw, shoulder, or arm. This pain can be worse when you move, sneeze, or take a deep breath.    Chest pain can be caused by a condition that is life-threatening. This must be treated right away. It can also be caused by something that is not life-threatening. If you have chest pain, it can be hard to know the difference, so it is important to get help right away to make sure that you do not have a serious condition.    Some life-threatening causes of chest pain include:  Heart attack.  A tear in the body's main blood vessel (aortic dissection).  Inflammation around your heart (pericarditis).  A problem in the lungs, such as a blood clot (pulmonary embolism) or a collapsed lung (pneumothorax).  Some non life-threatening causes of chest pain include:  Heartburn.  Anxiety or stress.  Damage to the bones, muscles, and cartilage that make up your chest wall.  Pneumonia or bronchitis.  Shingles infection (varicella-zoster virus).  Your chest pain may come and go. It may also be constant. Your health care provider will do tests and other studies to find the cause of your pain. Treatment will depend on the cause of your chest pain.    Follow these instructions at home:  Medicines    Take over-the-counter and prescription medicines only as told by your health care provider.  If you were prescribed an antibiotic medicine, take it as told by your health care provider. Do not stop taking the antibiotic even if you start to feel better.  Activity    Avoid any activities that cause chest pain.  Do not lift anything that is heavier than 10 lb (4.5 kg), or the limit that you are told, until your health care provider says that it is safe.  Rest as directed by your health care provider.  Return to your normal activities only as told by your health care provider. Ask your health care provider what activities are safe for you.  Lifestyle    A plate along with examples of foods in a healthy diet.  Silhouette of a person sitting on the floor doing yoga.  Do not use any products that contain nicotine or tobacco, such as cigarettes, e-cigarettes, and chewing tobacco. If you need help quitting, ask your health care provider.  Do not drink alcohol.  Make healthy lifestyle changes as recommended. These may include:  Getting regular exercise. Ask your health care provider to suggest some exercises that are safe for you.  Eating a heart-healthy diet. This includes plenty of fresh fruits and vegetables, whole grains, low-fat (lean) protein, and low-fat dairy products. A dietitian can help you find healthy eating options.  Maintaining a healthy weight.  Managing any other health conditions you may have, such as high blood pressure (hypertension) or diabetes.  Reducing stress, such as with yoga or relaxation techniques.  General instructions    Pay attention to any changes in your symptoms.  It is up to you to get the results of any tests that were done. Ask your health care provider, or the department that is doing the tests, when your results will be ready.  Keep all follow-up visits as told by your health care provider. This is important.  You may be asked to go for further testing if your chest pain does not go away.  Contact a health care provider if:  Your chest pain does not go away.  You feel depressed.  You have a fever.  You notice changes in your symptoms or develop new symptoms.  Get help right away if:  Your chest pain gets worse.  You have a cough that gets worse, or you cough up blood.  You have severe pain in your abdomen.  You faint.  You have sudden, unexplained chest discomfort.  You have sudden, unexplained discomfort in your arms, back, neck, or jaw.  You have shortness of breath at any time.  You suddenly start to sweat, or your skin gets clammy.  You feel nausea or you vomit.  You suddenly feel lightheaded or dizzy.  You have severe weakness, or unexplained weakness or fatigue.  Your heart begins to beat quickly, or it feels like it is skipping beats.  These symptoms may represent a serious problem that is an emergency. Do not wait to see if the symptoms will go away. Get medical help right away. Call your local emergency services (911 in the [place] Do not drive yourself to the hospital.    Summary  Chest pain can be caused by a condition that is serious and requires urgent treatment. It may also be caused by something that is not life-threatening.  Your health care provider may do lab tests and other studies to find the cause of your pain.  Follow your health care provider's instructions on taking medicines, making lifestyle changes, and getting emergency treatment if symptoms become worse.  Keep all follow-up visits as told by your health care provider. This includes visits for any further testing if your chest pain does not go away.  This information is not intended to replace advice given to you by your health care provider. Make sure you discuss any questions you have with your health care provider.    Document Revised: 11/02/2023 Document Reviewed: 11/02/2023  [employer] Patient Education © 2025 [employer]  [employer] logo  Terms and Conditions  Privacy Policy  Editorial Policy  All content on this site: Copyright © 2025 Else[employer], its licensors, and contributors. All rights are reserved, including those for text and data mining, AI training, and similar technologies. For all open access content, the Creative Commons licensing terms apply.  Cookies are used by this site. To decline or learn more, visit our Cookies page.

## 2025-03-04 ENCOUNTER — RESULT REVIEW (OUTPATIENT)
Age: 65
End: 2025-03-04

## 2025-03-04 VITALS
SYSTOLIC BLOOD PRESSURE: 138 MMHG | TEMPERATURE: 98 F | DIASTOLIC BLOOD PRESSURE: 78 MMHG | RESPIRATION RATE: 18 BRPM | HEART RATE: 70 BPM | OXYGEN SATURATION: 98 %

## 2025-03-04 LAB
A1C WITH ESTIMATED AVERAGE GLUCOSE RESULT: 6.2 % — HIGH (ref 4–5.6)
CHOLEST SERPL-MCNC: 106 MG/DL — SIGNIFICANT CHANGE UP
ESTIMATED AVERAGE GLUCOSE: 131 MG/DL — HIGH (ref 68–114)
HDLC SERPL-MCNC: 44 MG/DL — LOW
LIPID PNL WITH DIRECT LDL SERPL: 42 MG/DL — SIGNIFICANT CHANGE UP
NON HDL CHOLESTEROL: 62 MG/DL — SIGNIFICANT CHANGE UP
TRIGL SERPL-MCNC: 105 MG/DL — SIGNIFICANT CHANGE UP

## 2025-03-04 PROCEDURE — 83880 ASSAY OF NATRIURETIC PEPTIDE: CPT

## 2025-03-04 PROCEDURE — 83690 ASSAY OF LIPASE: CPT

## 2025-03-04 PROCEDURE — 80061 LIPID PANEL: CPT

## 2025-03-04 PROCEDURE — 36415 COLL VENOUS BLD VENIPUNCTURE: CPT

## 2025-03-04 PROCEDURE — 93306 TTE W/DOPPLER COMPLETE: CPT

## 2025-03-04 PROCEDURE — 85379 FIBRIN DEGRADATION QUANT: CPT

## 2025-03-04 PROCEDURE — 93306 TTE W/DOPPLER COMPLETE: CPT | Mod: 26

## 2025-03-04 PROCEDURE — G0378: CPT

## 2025-03-04 PROCEDURE — 99285 EMERGENCY DEPT VISIT HI MDM: CPT | Mod: GC

## 2025-03-04 PROCEDURE — 85025 COMPLETE CBC W/AUTO DIFF WBC: CPT

## 2025-03-04 PROCEDURE — 99239 HOSP IP/OBS DSCHRG MGMT >30: CPT

## 2025-03-04 PROCEDURE — 84484 ASSAY OF TROPONIN QUANT: CPT

## 2025-03-04 PROCEDURE — 93005 ELECTROCARDIOGRAM TRACING: CPT

## 2025-03-04 PROCEDURE — 71045 X-RAY EXAM CHEST 1 VIEW: CPT

## 2025-03-04 PROCEDURE — 80053 COMPREHEN METABOLIC PANEL: CPT

## 2025-03-04 PROCEDURE — 93356 MYOCRD STRAIN IMG SPCKL TRCK: CPT

## 2025-03-04 PROCEDURE — 83036 HEMOGLOBIN GLYCOSYLATED A1C: CPT

## 2025-03-04 PROCEDURE — 96374 THER/PROPH/DIAG INJ IV PUSH: CPT | Mod: XU

## 2025-03-04 PROCEDURE — 99285 EMERGENCY DEPT VISIT HI MDM: CPT | Mod: 25

## 2025-03-04 RX ORDER — MAGNESIUM, ALUMINUM HYDROXIDE 200-200 MG
30 TABLET,CHEWABLE ORAL ONCE
Refills: 0 | Status: COMPLETED | OUTPATIENT
Start: 2025-03-04 | End: 2025-03-04

## 2025-03-04 NOTE — ED CDU PROVIDER SUBSEQUENT DAY NOTE - TEMPLATE, MLM
Cardiac Abbe Flap (Upper To Lower Lip) Text: The defect of the lower lip was assessed and measured.  Given the location and size of the defect, an Abbe flap was deemed most appropriate.  Using a sterile surgical marker, an appropriate Abbe flap was measured and drawn on the upper lip. Local anesthesia was then infiltrated.  A scalpel was then used to incise the upper lip through and through the skin, vermilion, muscle and mucosa, leaving the flap pedicled on the opposite side.  The flap was then rotated and transferred to the lower lip defect.  The flap was then sutured into place with a three layer technique, closing the orbicularis oris muscle layer with subcutaneous buried sutures, followed by a mucosal layer and an epidermal layer.

## 2025-03-04 NOTE — PROGRESS NOTE ADULT - SUBJECTIVE AND OBJECTIVE BOX
Cardiology Progress Note  ------------------------------------------------------------------------------------------  SUBJECTIVE:   - Tele: sinus    REVIEW OF SYSTEMS:  CONSTITUTIONAL: No weakness, fevers or chills  EYES/ENT: No visual changes;  Nothroat pain   NECK: No pain or stiffness  RESPIRATORY: No cough, wheezing, hemoptysis; No shortness of breath  CARDIOVASCULAR: No chest pain or palpitations  GASTROINTESTINAL: occasional epigastric discomfort. No nausea, vomiting, or hematemesis; No diarrhea or constipation. No melena or hematochezia.  GENITOURINARY: No dysuria, frequency or hematuria  NEUROLOGICAL: No numbness or weakness  SKIN: No itching, rashes    -------------------------------------------------------------------------------------------  VS:  T(F): 98.2 (03-04), Max: 99.2 (03-03)  HR: 64 (03-04) (56 - 90)  BP: 153/82 (03-04) (108/63 - 159/95)  RR: 16 (03-04)  SpO2: 98% (03-04)    PHYSICAL EXAM:  GENERAL: no acute distress  NECK: JVP is NOT elevated  CHEST/LUNG: clear to auscultation bilaterally, unlabored breathing  HEART: regular rate and rhythm, no murmurs  ABDOMEN: soft, non-tender, non-distended  EXTREMITIES:  warm, no LE edema  NEURO: no focal deficits  PSYCH: calm, cooperative  SKIN: no new rashes    -------------------------------------------------------------------------------------------  LABS                      11.9   6.66  )-----------( 396      ( 03 Mar 2025 13:41 )             36.2     03-03    143  |  104  |  8   ----------------------------<  109[H]  3.7   |  28  |  0.88    Ca    9.7      03 Mar 2025 13:41    TPro  8.1  /  Alb  4.1  /  TBili  0.4  /  DBili  x   /  AST  22  /  ALT  20  /  AlkPhos  74  03-03    CARDIAC MARKERS ( 03 Mar 2025 18:42 )  <6 ng/L / x     / x     / x     / x     / x      CARDIAC MARKERS ( 03 Mar 2025 13:41 )  <6 ng/L / x     / x     / x     / x     / x        -------------------------------------------------------------------------------------------  Meds:  aspirin enteric coated 81 milliGRAM(s) Oral daily  hydrochlorothiazide 12.5 milliGRAM(s) Oral daily  letrozole 2.5 milliGRAM(s) Oral daily  lisinopril 40 milliGRAM(s) Oral daily  rosuvastatin 20 milliGRAM(s) Oral at bedtime    -------------------------------------------------------------------------------------------  Cardiovascular Diagnostic Testing:    CXR: clear lungs    ECG: sinus rhythm with RBBB (similar to prior)    Echo:     2012:  Conclusions:  1. Mild mitral regurgitation.  2.  Minimal aortic regurgitation.  3. Normal left ventricular systolic function. No segmental  wall motion abnormalities.  4. Normal right ventricular systolic function.  5. Estimated right ventricular systolic pressure equals 20  mm Hg, assuming right atrial pressure equals 10 mm Hg,  consistent with normal pulmonary pressures. Incomplete  tricuspid regurgitation Doppler envelopes in this study may  underestimate RVSP.  6. Normal tricuspid valve. Mild tricuspid regurgitation.    Stress Testing:  Conclusions:   1. Myocardial Perfusion: Abnormal.   2. The patient underwent stress testing using the standard Lenny protocol.      _ The patient exercised for 8 min 59 sec.      _ The test was stopped due to fatigue.      _ The patient achieved 10.1 METS which is consistent with good exercise capacity.   3. Baseline electrocardiogram: normal sinus rhythm at a rate of 72 bpm with right bundle branch block and nonspecific ST-T wave abnormalities.   4. Stress electrocardiogram: There were approximately 1.5 mm horizontal ST segment depressions in leads V3-V6 starting at 3:00 min of exercise at 118 bpm and persisting 1:50 min of recovery. At 3:50 min of recovery at 93 bpm, approximately 0.5 mm horizontal ST segment depressions developed inthese leads and persisted at least 4:50 min in recovery.   5. Perfusion Findings:      --There are large, mild to moderate defects in the anterior, anteroseptal, anterolateral, and apical walls that are mostly reversible suggestive of infarct with significant marcos-infarct ischemia.      --There are large, mild to moderate defects in the inferior, basal inferolateral, and basal inferoseptal walls that are partially reversible suggestive of infarct with at least moderate marcos-infarct ischemia.   6.The left ventricle is normal in size. The left ventricular EF% during stress is 69 %.   7. Post-stress gated wall motion analysis revealed hypokinesis of the basal anteroseptal wall and reduced systolic thickening of the anterior, anterolateral, mid to distal anteroseptal, apical, inferior, basal inferolateral, and basal inferoseptal walls.    Cath:    10/2023:  LM   Left main artery: The segment is visually normal in size and  structure.    LAD   Proximal left anterior descending: There is a 50 % stenosis within the  stented segment. Instant wave-freeratio was performed  with a calculated value of 0.91. Based on the results of this study,  the lesion was judged to be non-significant and no  intervention was performed. First diagonal: There is a 100 % stenosis  within the stented segment. Second diagonal: The  segment is visually normal in size and structure.      CX   Circumflex: Angiography shows minor irregularities. First obtuse  marginal: Angiography shows minor irregularities.    RCA   Right coronary artery: Angiography shows mild atherosclerosis.      Left Heart Cath   [facility] performed: Aortic valve crossed and left ventricular pressures  were obtained.    Diagnostic Conclusions:     Occluded D1 stent - filling via D2 collaterals. Mild-moderate LAD  stent restenosis with normal IFR. Minimal Lcx and RCA  disease.   Recommendations:     Medical therapy.   -------------------------------------------------------------------------------------------

## 2025-03-04 NOTE — PROGRESS NOTE ADULT - ASSESSMENT
63 y/o female with PMHx of CAD s/p (TOBY to LAD and D1,  stent to D1 is 100% occluded), hypertension, hyperlipidemia, who presents with epigsatric tightness. No clear exacerbating or alleviating factors. Labs and EKG are not concerning for ACS.    - Epigastric pain  - CAD s/p TOBY to LAD and D1    Unclear etiology of symptoms. However, low concern for acute coronary syndrome. Symptoms are also NOT typical of angina. Might potentially be due to non-cardiac causes (i.e. GI, musculoskeletal).    Received antacids yesterday in ED. Her pain feels a bit better this morning.     - Cath in 2023 showed D1 stent is 100% occluded, and LAD stent has ~50% in-stent restenosis. IFR 0.91. Medical therapy was recommended.  - TTE (prelim) has no major abnormalities.    RECOMMENDATIONS:  - continue  home ASA + rosuvastatin + lisinopril + HCTZ  - can follow-up with cardiologist Dr. Pat Leonard next week to discuss a repeat angiogram as an outpatient if her symptoms continue to persist  - no additional inpatient cardiac testing is necessary      Chris Escalera (PGY 4)  Cardiology Fellow      Of note, these recommendations are preliminary. Case to be discussed with attending. Please see attending attestation for final recommendations.

## 2025-03-04 NOTE — ED CDU PROVIDER SUBSEQUENT DAY NOTE - HISTORY
CDU PROGRESS NOTE PA JO:  Pt resting in stretcher in NAD. VSS. Physical exam unremarkable. No events on tele. Cards recs appreciated, pending TTE in am. HsT x2 negative.

## 2025-03-04 NOTE — PROGRESS NOTE ADULT - ATTENDING COMMENTS
64 year old woman with coronary stents, mild LAD in-stent stenosis, occluded diagonal stent presents with prolonged epigastric pain, initially observed when woke at 4:30 AM, returned to sleep and continued to observe when woke few hours later. Came to ER with pain persisting. No EKG evidence for ischemia, troponin < 6. Have had 24 hours CDU monitoring, obtained cardiac echo and reviewed prior to official read, demonstrates normal ventricular function entirely normal study. Repeat troponin < 6, continues with all findings indicating not an ACS. Discussed with her Cardiologist, Dr. Leonard, all agree she is low risk, does not need to remain in acute hospital setting. Plan discharge to home, follow up with Dr. Leonard and consider merits of coronary angiography.    To contact call Cardiology Fellow or Attending as listed on [email or web address] password: [email or web address].

## 2025-03-04 NOTE — ED CDU PROVIDER SUBSEQUENT DAY NOTE - PROGRESS NOTE DETAILS
CDU NOTE SONIA Benitez: pt resting comfortably, feels well without complaint. NAD VSS. no events on tele. CDU NOTE SONIA Benitez: pt seen by Cardiology Attending Dr. Rabago. As per Cards fellow, Dr. Rabago is clearing patient from cardiac standpoint to f/up outpatient with Dr. Leonard. No further inpatient work-up warranted.   pt comfortable with plan/discharge.  As per Dr. Grimaldo, pt stable for d/c home.

## 2025-03-04 NOTE — ED ADULT NURSE REASSESSMENT NOTE - NS ED NURSE REASSESS COMMENT FT1
Pt received from RICHIE Maciel. Pt oriented to CDU & plan of care was discussed. Pt A&O x 4. Pt in CDU for frequent reeval, vitals q 4hrs, telemetry monitoring and TTE. Pt c/o chest pressure. Pt denies any chest pain, SOB, dizziness or palpitations. Pt on a cardiac monitor in NSR, HR in 60's. V/S stable, pt afebrile,  IV in place, patent and free of signs of infiltration. Pt resting in bed. Safety & comfort measures maintained. Call bell in reach. Will continue to monitor.

## 2025-03-10 DIAGNOSIS — E53.1 PYRIDOXINE DEFICIENCY: ICD-10-CM

## 2025-03-10 DIAGNOSIS — G63 PYRIDOXINE DEFICIENCY: ICD-10-CM

## 2025-03-10 RX ORDER — UREA 10 %
50 LOTION (ML) TOPICAL DAILY
Qty: 30 | Refills: 2 | Status: ACTIVE | COMMUNITY
Start: 2025-03-10 | End: 1900-01-01

## 2025-03-12 ENCOUNTER — OUTPATIENT (OUTPATIENT)
Dept: OUTPATIENT SERVICES | Facility: HOSPITAL | Age: 65
LOS: 1 days | End: 2025-03-12
Payer: MEDICARE

## 2025-03-12 ENCOUNTER — TRANSCRIPTION ENCOUNTER (OUTPATIENT)
Age: 65
End: 2025-03-12

## 2025-03-12 VITALS
OXYGEN SATURATION: 98 % | WEIGHT: 173.94 LBS | HEART RATE: 71 BPM | RESPIRATION RATE: 15 BRPM | HEIGHT: 60 IN | TEMPERATURE: 99 F | DIASTOLIC BLOOD PRESSURE: 72 MMHG | SYSTOLIC BLOOD PRESSURE: 129 MMHG

## 2025-03-12 VITALS
OXYGEN SATURATION: 100 % | TEMPERATURE: 99 F | DIASTOLIC BLOOD PRESSURE: 63 MMHG | RESPIRATION RATE: 16 BRPM | HEART RATE: 61 BPM | SYSTOLIC BLOOD PRESSURE: 110 MMHG

## 2025-03-12 DIAGNOSIS — Z95.5 PRESENCE OF CORONARY ANGIOPLASTY IMPLANT AND GRAFT: Chronic | ICD-10-CM

## 2025-03-12 DIAGNOSIS — I20.0 UNSTABLE ANGINA: ICD-10-CM

## 2025-03-12 DIAGNOSIS — Z98.890 OTHER SPECIFIED POSTPROCEDURAL STATES: Chronic | ICD-10-CM

## 2025-03-12 LAB
ANION GAP SERPL CALC-SCNC: 13 MMOL/L — SIGNIFICANT CHANGE UP (ref 5–17)
BUN SERPL-MCNC: 17 MG/DL — SIGNIFICANT CHANGE UP (ref 7–23)
CALCIUM SERPL-MCNC: 9.8 MG/DL — SIGNIFICANT CHANGE UP (ref 8.4–10.5)
CHLORIDE SERPL-SCNC: 99 MMOL/L — SIGNIFICANT CHANGE UP (ref 96–108)
CO2 SERPL-SCNC: 26 MMOL/L — SIGNIFICANT CHANGE UP (ref 22–31)
CREAT SERPL-MCNC: 1 MG/DL — SIGNIFICANT CHANGE UP (ref 0.5–1.3)
EGFR: 62 ML/MIN/1.73M2 — SIGNIFICANT CHANGE UP
EGFR: 62 ML/MIN/1.73M2 — SIGNIFICANT CHANGE UP
GLUCOSE SERPL-MCNC: 100 MG/DL — HIGH (ref 70–99)
HCT VFR BLD CALC: 36.1 % — SIGNIFICANT CHANGE UP (ref 34.5–45)
HGB BLD-MCNC: 11.9 G/DL — SIGNIFICANT CHANGE UP (ref 11.5–15.5)
MCHC RBC-ENTMCNC: 28.1 PG — SIGNIFICANT CHANGE UP (ref 27–34)
MCHC RBC-ENTMCNC: 33 G/DL — SIGNIFICANT CHANGE UP (ref 32–36)
MCV RBC AUTO: 85.1 FL — SIGNIFICANT CHANGE UP (ref 80–100)
NRBC BLD AUTO-RTO: 0 /100 WBCS — SIGNIFICANT CHANGE UP (ref 0–0)
PLATELET # BLD AUTO: 412 K/UL — HIGH (ref 150–400)
POTASSIUM SERPL-MCNC: 3.6 MMOL/L — SIGNIFICANT CHANGE UP (ref 3.5–5.3)
POTASSIUM SERPL-SCNC: 3.6 MMOL/L — SIGNIFICANT CHANGE UP (ref 3.5–5.3)
RBC # BLD: 4.24 M/UL — SIGNIFICANT CHANGE UP (ref 3.8–5.2)
RBC # FLD: 13.9 % — SIGNIFICANT CHANGE UP (ref 10.3–14.5)
SODIUM SERPL-SCNC: 138 MMOL/L — SIGNIFICANT CHANGE UP (ref 135–145)
WBC # BLD: 5.21 K/UL — SIGNIFICANT CHANGE UP (ref 3.8–10.5)
WBC # FLD AUTO: 5.21 K/UL — SIGNIFICANT CHANGE UP (ref 3.8–10.5)

## 2025-03-12 PROCEDURE — C1874: CPT

## 2025-03-12 PROCEDURE — C1894: CPT

## 2025-03-12 PROCEDURE — 92978 ENDOLUMINL IVUS OCT C 1ST: CPT | Mod: LD

## 2025-03-12 PROCEDURE — C1769: CPT

## 2025-03-12 PROCEDURE — 93571 IV DOP VEL&/PRESS C FLO 1ST: CPT | Mod: LD

## 2025-03-12 PROCEDURE — 93571 IV DOP VEL&/PRESS C FLO 1ST: CPT | Mod: 26,LD

## 2025-03-12 PROCEDURE — 36415 COLL VENOUS BLD VENIPUNCTURE: CPT

## 2025-03-12 PROCEDURE — 93010 ELECTROCARDIOGRAM REPORT: CPT | Mod: 77

## 2025-03-12 PROCEDURE — 93010 ELECTROCARDIOGRAM REPORT: CPT

## 2025-03-12 PROCEDURE — 99152 MOD SED SAME PHYS/QHP 5/>YRS: CPT

## 2025-03-12 PROCEDURE — C1753: CPT

## 2025-03-12 PROCEDURE — 93458 L HRT ARTERY/VENTRICLE ANGIO: CPT | Mod: 26,59

## 2025-03-12 PROCEDURE — 80048 BASIC METABOLIC PNL TOTAL CA: CPT

## 2025-03-12 PROCEDURE — C1887: CPT

## 2025-03-12 PROCEDURE — C9600: CPT | Mod: LD

## 2025-03-12 PROCEDURE — 92928 PRQ TCAT PLMT NTRAC ST 1 LES: CPT | Mod: LD

## 2025-03-12 PROCEDURE — 85027 COMPLETE CBC AUTOMATED: CPT

## 2025-03-12 PROCEDURE — 92978 ENDOLUMINL IVUS OCT C 1ST: CPT | Mod: 26,LD

## 2025-03-12 PROCEDURE — 93005 ELECTROCARDIOGRAM TRACING: CPT

## 2025-03-12 PROCEDURE — C1725: CPT

## 2025-03-12 PROCEDURE — 93458 L HRT ARTERY/VENTRICLE ANGIO: CPT | Mod: 59

## 2025-03-12 RX ORDER — CLOPIDOGREL BISULFATE 75 MG/1
75 TABLET, FILM COATED ORAL DAILY
Refills: 0 | Status: DISCONTINUED | OUTPATIENT
Start: 2025-03-12 | End: 2025-03-26

## 2025-03-12 RX ORDER — LETROZOLE 2.5 MG/1
2.5 TABLET, FILM COATED ORAL DAILY
Refills: 0 | Status: DISCONTINUED | OUTPATIENT
Start: 2025-03-12 | End: 2025-03-26

## 2025-03-12 RX ORDER — ROSUVASTATIN CALCIUM 20 MG/1
20 TABLET, FILM COATED ORAL AT BEDTIME
Refills: 0 | Status: DISCONTINUED | OUTPATIENT
Start: 2025-03-12 | End: 2025-03-26

## 2025-03-12 RX ORDER — CALCIUM CARBONATE 750 MG/1
0 TABLET ORAL
Refills: 0 | DISCHARGE

## 2025-03-12 RX ORDER — CLOPIDOGREL BISULFATE 75 MG/1
1 TABLET, FILM COATED ORAL
Qty: 90 | Refills: 3
Start: 2025-03-12 | End: 2026-03-06

## 2025-03-12 RX ORDER — LETROZOLE 2.5 MG/1
1 TABLET, FILM COATED ORAL
Refills: 0 | DISCHARGE

## 2025-03-12 RX ORDER — ASPIRIN 325 MG
81 TABLET ORAL DAILY
Refills: 0 | Status: DISCONTINUED | OUTPATIENT
Start: 2025-03-12 | End: 2025-03-26

## 2025-03-12 RX ORDER — LISINOPRIL 5 MG/1
40 TABLET ORAL DAILY
Refills: 0 | Status: DISCONTINUED | OUTPATIENT
Start: 2025-03-12 | End: 2025-03-26

## 2025-03-12 RX ORDER — ROSUVASTATIN CALCIUM 20 MG/1
1 TABLET, FILM COATED ORAL
Refills: 0 | DISCHARGE

## 2025-03-12 RX ORDER — ASPIRIN 325 MG
1 TABLET ORAL
Qty: 90 | Refills: 3
Start: 2025-03-12 | End: 2026-03-06

## 2025-03-12 RX ADMIN — Medication 100 MILLILITER(S): at 11:36

## 2025-03-12 NOTE — ASU PATIENT PROFILE, ADULT - FALL HARM RISK - UNIVERSAL INTERVENTIONS
Bed in lowest position, wheels locked, appropriate side rails in place/Call bell, personal items and telephone in reach/Instruct patient to call for assistance before getting out of bed or chair/Non-slip footwear when patient is out of bed/Dinosaur to call system/Physically safe environment - no spills, clutter or unnecessary equipment/Purposeful Proactive Rounding/Room/bathroom lighting operational, light cord in reach

## 2025-03-12 NOTE — ASU PATIENT PROFILE, ADULT - MEDICATION ADMINISTRATION INFO, PROFILE
[Acute] : for an acute visit [Diarrhea] : diarrhea [Parents] : parents [FreeTextEntry6] : pt with 2-3d h/o D, SA. + c/c. No fever. 3BM per day, no blood\par No ill exp no concerns

## 2025-03-12 NOTE — ASU DISCHARGE PLAN (ADULT/PEDIATRIC) - ASU DC SPECIAL INSTRUCTIONSFT
Wound Care:   the day AFTER your procedure remove bandage GENTTLY, and clean using  mild soap and gentle warm, water stream, pat dry. leave OPEN to air. YOU MAY SHOWER   DO NOT apply lotions, creams, ointments, powder, parfumes to your incision site  DO NOT SOAK your site for 1 week ( no baths, no pools, no tubs, etc...)  Check  your groin and /or wirst daily.A small amount of bruising, and soarness are normal    ACTIVITY: for 24 hours   - DO NOT DRIVE  - DO NOT make any important decisions or sign legal documents   - DO NOT operate heavy machinaries   - you may resume sexual activity in 48 hours, unless otherwise instructed by your cardiologist     If your procedure was done through the WRIST: for the NEXT 3DAYS:  - avoid pushing, pulling, with that affected wrist   - avoid repeated movement of that hand and wrist ( eg: typing, hammering)  - DO NOT LIFT anything more than 5 lbs     If your procedure was done through the GROIN: for the NEXT 5 DAYS  - Limit climbing stairs, DO NOT soak in bathtub or pool  - no strenous activities, pushing, pulling, straining  - Do not lift anything 10lbs or heavier     MEDICATION:   take your medications as explained ( see discharge paperwork)   If you received a STENT, you will be taking antiplatelet medications to KEEP YOUR STENT OPEN ( eg: Aspirin, Plavix, Brilinta, Effient, etc).  Take as prescribed DO NOT STOP taking them without consulting with your cardiologist first.     Follow heart healthy diet reccomended by your doctor, , if you smoke STOP SMOKING ( may call [phone] for center of tobacco control if you need assistance)     CALL your doctor to make appointment in 2 WEEKS     ***CALL YOUR DOCTOR***  if you experience: fever, chills, body aches, or severe pain, swelling, redness, heat or yellow discharge at incision site  If you experience Bleeding or excruciating pain at the procedural site, sweliing ( golf ball size) at your procedural site  If you experience CHEST PAIN  If you experience extremity numbness, tingling, temperature change ( of your procedural site)   If you are unable to reach your doctor, you may contact:   -Cardiology Office at [facility] at [phone] or   - [facility] [phone] - [facility] [phone]

## 2025-03-12 NOTE — H&P CARDIOLOGY - HISTORY OF PRESENT ILLNESS
63 y/o female with pmh of HTN, HLD, prediabetes (AIc 6.2), CAD c/b NSTEMI s/p PCI/TOBY mLAD and D1(2/19/2015), [facility] 10/11/2023 Occluded D1 stent - filling via D2 collaterals. Mild-moderate LAD stent restenosis with normal IFR. Minimal Lcx and RCA  disease followed by Dr. Leonard, Cardiologist with reports of chest pressure that occurs randomly over the past week that led to an admission to the CDU/ED for these symptoms.  She was discharged and presents for [facility] 2/2 UA for further evaluation of her CAD.   She denies cp, sob of palpitations presently.      TTE 3/4/2025  CONCLUSIONS:      1. Left ventricular cavity is normal in size. Left ventricular systolic function is normal with an ejection fraction of 56 % by 3D. There are no regional wall motion abnormalities seen.   2. Normal right ventricular cavity size and normal right ventricular systolic function.   3. Estimated pulmonary artery systolic pressure is 23 mmHg.   4. No significant valvular disease.   5. No pericardial effusion seen.   6. There are no recent studies available for comparison.

## 2025-03-12 NOTE — ASU DISCHARGE PLAN (ADULT/PEDIATRIC) - CARE PROVIDER_API CALL
Pat Leonard  Cardiovascular Disease  [address] [facility], [address] [place] [id number]  Phone: [phone]  Fax: [phone]  Follow Up Time: 2 weeks

## 2025-03-12 NOTE — ASU PATIENT PROFILE, ADULT - NSICDXPASTMEDICALHX_GEN_ALL_CORE_FT
LF: 8/18/2021  LOV: 12/30/2019  F/U: 1 month f/u  Pharmacy requesting 90 supply, please approve or deny
PAST MEDICAL HISTORY:  Benign breast lumps     CAD (coronary artery disease)     HLD (hyperlipidemia)     HTN (hypertension)

## 2025-03-12 NOTE — ASU DISCHARGE PLAN (ADULT/PEDIATRIC) - FINANCIAL ASSISTANCE
[facility] provides services at a reduced cost to those who are determined to be eligible through [facility]’s financial assistance program. Information regarding [facility]’s financial assistance program can be found by going to [email or web address] or by calling [phone].

## 2025-03-12 NOTE — H&P CARDIOLOGY - BIRTH SEX
Level of Care: Telemetry [5]   Diagnosis: Intractable nausea and vomiting [703423]   Admitting Physician: ABBI STEWART [[id number]]   Attending Physician: ABBI STEWART [[id number]]   Female

## 2025-04-08 ENCOUNTER — NON-APPOINTMENT (OUTPATIENT)
Age: 65
End: 2025-04-08

## 2025-04-08 ENCOUNTER — APPOINTMENT (OUTPATIENT)
Dept: CARDIOLOGY | Facility: CLINIC | Age: 65
End: 2025-04-08
Payer: MEDICARE

## 2025-04-08 VITALS
HEIGHT: 60 IN | WEIGHT: 176 LBS | SYSTOLIC BLOOD PRESSURE: 115 MMHG | HEART RATE: 65 BPM | DIASTOLIC BLOOD PRESSURE: 71 MMHG | BODY MASS INDEX: 34.55 KG/M2 | OXYGEN SATURATION: 99 %

## 2025-04-08 DIAGNOSIS — E78.5 HYPERLIPIDEMIA, UNSPECIFIED: ICD-10-CM

## 2025-04-08 DIAGNOSIS — I25.10 ATHEROSCLEROTIC HEART DISEASE OF NATIVE CORONARY ARTERY W/OUT ANGINA PECTORIS: ICD-10-CM

## 2025-04-08 PROCEDURE — 93000 ELECTROCARDIOGRAM COMPLETE: CPT

## 2025-04-08 PROCEDURE — 99214 OFFICE O/P EST MOD 30 MIN: CPT

## 2025-05-26 NOTE — DISCUSSION/SUMMARY
PODIATRY PROGRESS NOTE     SERVICE DATE: 5/26/2025       SERVICE TIME:  12:26 PM         Consults   PRIMARY CARE PHYSICIAN: Mone Aquino MD     Subjective  S/P Left 4th and 5th metatarsal resection with Left Lower extremity debridement POD#1 (DOS: 5/25/25) with Dr. Ramos   Pt seen at bedside.  No overnight events.  Pt admits to some pain to operative extremity.  Has been elevating foot on pillow.  Pt denies any constitutional symptoms.   No other pedal complaints at this time.     HPI: Ms. Squires is a 58 y.o. female  who presents for Left foot infection, is on day 0 of admission. 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 Garcia DPM for osteomyelitis of L foot. Patient followed up with her podiatrist in 5/23 and had xrays concerning for osteomyelitis and possible gas. Patient endorses 5/10 sharp shooting pain to her left lower extremity. Patient did not know she had a wound on the bottom of her foot until seeing her podiatrist and thus does not know how much drainage she has had. Patient denies being on PO abx. Discussed with patient emergent need for a Left TMA. Patient agreeable. Discussed postoperative course with the patient.  Patient denies any constitutional symptoms. No other pedal complaints.      Podiatry was Consulted for: Left foot wound/ Infection     [Medical History]    [Medical History]  Past Medical History       Diagnosis Date    Hypertensive heart and kidney disease with HF and with CKD stage IV 01/06/2025    Personal history of other diseases of the circulatory system       History of hypertension    Personal history of other endocrine, nutritional and metabolic disease       History of hyperlipidemia    Personal history of other endocrine, nutritional and metabolic disease       History of diabetes mellitus     [Surgical History]    [Surgical History]  Past Surgical History       [FreeTextEntry1] : CAD- [id number] with collaterals. Mod LAD dz without symptoms and normal IFR Labs pending  Encouraged exercise    Procedure Laterality Date    CARDIAC CATHETERIZATION N/A 1/8/2025     Procedure: Right Heart Cath;  Surgeon: Migel Stanton MD;  Location: [place] [address] Cardiac Cath Lab;  Service: Cardiovascular;  Laterality: N/A;    CARDIAC CATHETERIZATION N/A 1/17/2025     Procedure: Right Heart Cath;  Surgeon: Ana Lea MD;  Location: [place] [address] Cardiac Cath Lab;  Service: Cardiovascular;  Laterality: N/A;    CT ANGIO NECK   1/25/2023     CT NECK ANGIO W AND WO IV CONTRAST 1/25/2023 DOCTOR OFFICE LEGACY    CT HEAD ANGIO W AND WO IV CONTRAST   1/25/2023     CT HEAD ANGIO W AND WO IV CONTRAST 1/25/2023 DOCTOR OFFICE LEGACY    OTHER SURGICAL HISTORY   10/21/2020     Toe amputation     [Family History]    [Family History]         Problem Relation Name Age of Onset    Alzheimer's disease Mother        Diabetes Mother        Lung cancer Mother        Diabetes Father        Lung cancer Father        Pancreatic cancer Father        Diabetes Sister        Lung cancer Sister         [Social History]     [Social History]        Tobacco Use    Smoking status: Never       Passive exposure: Never    Smokeless tobacco: Never   Vaping Use    Vaping status: Never Used   Substance Use Topics    Alcohol use: Yes    Drug use: Never     [Prescriptions Prior to Admission]             [Prescriptions Prior to Admission]  (Not in a hospital admission)  [Allergies]     [Allergies]  No Known Allergies     Medications:  [Scheduled Medications]    [Scheduled Medications]  atorvastatin, 40 mg, oral, Nightly  carvedilol, 12.5 mg, oral, BID  clindamycin, 600 mg, intravenous, q8h  enoxaparin, 30 mg, subcutaneous, q24h  gabapentin, 300 mg, oral, BID  [Held by provider] hydrALAZINE, 50 mg, oral, TID  [Held by provider] isosorbide mononitrate ER, 30 mg, oral, Daily  polyethylene glycol, 17 g, oral, Daily  [Held by provider] torsemide, 40 mg, oral, Daily  vancomycin, 1,000 mg, intravenous, q24h  [START ON 5/25/2025] vitamin B complex-vitamin C-folic  [EKG obtained to assist in diagnosis and management of assessed problem(s)] : EKG obtained to assist in diagnosis and management of assessed problem(s) acid, 1 capsule, oral, Daily  [Continuous Medications]    [Continuous Medications]     [PRN Medications]    [PRN Medications]  PRN medications: dextrose, dextrose, glucagon, glucagon, oxyCODONE, vancomycin         Allergies as of 05/24/2025    (No Known Allergies)               Objective  PHYSICAL EXAM:      Vitals:     05/24/25 1830   BP: (!) 188/90   Pulse: 80   Resp: 17   Temp:     SpO2:        Body mass index is 41.15 kg/m².     Patient is AOx3 and in no acute distress. Patient is alert and cooperative. Sitting comfortably in bed with dressing clean, dry and intact.      Vascular: Palpable DP/PT pulses B/L. Moderate pitting edema noted B/L. Hair growth absent B/L. CFT<5 to B/L hallux. Temperature is warm to cool from tibial tuberosity to distal digits B/L. No lymphatic streaking noted B/L.     Musculoskeletal: Gross active and passive ROM intact to age and activity level. Moves all extremities spontaneously. No pain to palpation at feet B/L.      Neurological: Intact light touch sensation B/L. Pain stimuli diminished B/L. Denies any numbness, burning or tingling.     Dermatologic: 1-5 R are within normal limits for thickness and length B/L. Skin appears diffusely xerotic B/L.    Wound: Left Open Partial TMA Site  Wound bas of Granular tissue with metatarsals in wound base.   Significant serosanguinous drainage with fibrotic slough.   Mild selene-wound maceration.   Mild selene-wound erythema.   No evidence of ascending cellulitis or lymphangitis.  No  palpable fluctuance. No malodor. No increased warmth.   Significant probe to bone or deep structures within the wound base.   No undermining. Skin edges irregular but intact.     LABS:         Lab Results   Component Value Date     HGBA1C 7.3 (A) 05/20/2025            Lab Results   Component Value Date     CRP 2.21 (H) 05/24/2025            Lab Results   Component Value Date     SEDRATE 36 (H) 02/05/2025              Results from last 7 days   Lab Units 05/24/25  6310    WBC AUTO x10*3/uL 6.0   RBC AUTO x10*6/uL 2.80*   HEMOGLOBIN g/dL 8.1*   HEMATOCRIT % 25.6*           Results from last 7 days   Lab Units 05/24/25  1327   GLUCOSE mg/dL 265*   SODIUM mmol/L 136   POTASSIUM mmol/L 4.4   CHLORIDE mmol/L 104   CO2 mmol/L 23   BUN mg/dL 29*   CREATININE mg/dL 2.46*   CALCIUM mg/dL 8.5*   BILIRUBIN TOTAL mg/dL 0.4   ALT U/L 9   AST U/L 13         Cultures  No results found for the last 90 days.        IMAGING REVIEW:  XR foot left 3+ views 05/23/2025     Narrative  Interpreted By:  Raymundo Early,  STUDY:  XR FOOT LEFT 3+ VIEWS     INDICATION:  Signs/Symptoms:foot infection.     COMPARISON:  October 9, 2020     ACCESSION NUMBER(S):  [id number]     ORDERING CLINICIAN:  ANURAG BRUNNER     FINDINGS:  Postsurgical changes of 1st and 2nd digit amputation.     New bony erosive changes at the 4th proximal phalanx and metatarsal  head highly concerning for osteomyelitis.     Marked soft tissue swelling with possible soft tissue gas which could  be gas producing soft tissue infection.     Impression  New bony erosive changes at the 4th proximal phalanx and metatarsal  head highly concerning for osteomyelitis.     Marked soft tissue swelling with possible soft tissue gas which could  be gas producing soft tissue infection.     MACRO:  Critical Finding:  See findings. Notification was initiated on  5/24/2025 at 8:25 am by  Raymundo Early.  (**-OCF-**)     Signed by: Raymundo Early 5/24/2025 8:25 AM  Dictation workstation:   [id number]        Vascular   No results found for this or any previous visit from the past 365 days.              Assessment/Plan  ASSESSMENT & PLAN:  Shaw is a 58 y.o. female  who presents for Left foot infection, is on day 0 of admission.     #Osteomyelitis of L foot  #Diabetic foot infection, recurrent  #T2DM     - Patient was seen and evaluated; all findings were discussed and all questions were answered to patient's satisfaction.  - Charts, labs, vitals and imaging all  reviewed.   - H&H 6.9,   - Imaging:   Xray left foot:   New bony erosive changes at the 4th proximal phalanx and metatarsal  head highly concerning for osteomyelitis.  Marked soft tissue swelling with possible soft tissue gas which could  be gas producing soft tissue infection.  MRI Left Foot:   1. Osteomyelitis of the 4th metatarsal, 4th proximal phalanx, and  high likelihood of osteomyelitis of the 4th middle phalanx, as above.  Pathologic fracture of 4th metatarsal neck.  2. Osteomyelitis of the 5th proximal and middle phalanges, as above.  High likelihood of osteolysis of the 5th metatarsal head.  3. High likelihood of osteomyelitis 3rd proximal phalangeal base.  4. Multifocal soft tissue ulceration, cellulitis, and likely small  subcutaneous abscess, as above.  5. Postsurgical changes from prior 1st digit interphalangeal and 2nd  digit MTP joint amputations  Plan:  - Antibiotics: Per Primary team/ID  - Dressings:Iodofore packing  betadine soaked  4x4's Kerlix, ACE wrap  - Nursing staff is able to change/reinforce dressing if & as necessary until next dressing change. Thank you.  - Patient will require surgical intervention  - Patient scheduled for Left foot:  Delayed Primary Closure, Bone cortex Excision, Reverse Sural Flap for tomorrow, 5/27/25.  - NPO After Midnight  - Podiatry will continue to follow while in house.  - Follow-up with Dr. Socrates Ramos in [facility] ([address], [place] [address]) DVT ppx: Per Primary team  Weightbearing: WBAT b/l LE  Discharge: Pt to follow up 1 week after discharge with Dr. Ramos     Patient evaluated/discussed with attending Dr. Ramos DPARIEL     Case to be discussed with attending, A&P above reflects a tentative plan. Please await for the final signature from the attending physician on service.     Barrington Llanos DPM PGY-3  Podiatric Medicine & Surgery  [id number]  Epic Haiku chat preferred      I saw and evaluated the patient. I personally obtained the key and  critical portions of the history and physical exam or was physically present for key and critical portions performed by the resident/fellow. I reviewed the resident/fellow's documentation and discussed the patient with the resident/fellow. I agree with the resident/fellow's medical decision making as documented in the note.    I spent 30 minutes in the professional and overall care of this patient.    Socrates Ramos DPM

## 2025-07-18 ENCOUNTER — RX RENEWAL (OUTPATIENT)
Age: 65
End: 2025-07-18

## 2025-08-25 ENCOUNTER — NON-APPOINTMENT (OUTPATIENT)
Age: 65
End: 2025-08-25

## 2025-08-26 ENCOUNTER — APPOINTMENT (OUTPATIENT)
Dept: CARDIOLOGY | Facility: CLINIC | Age: 65
End: 2025-08-26
Payer: MEDICARE

## 2025-08-26 VITALS
BODY MASS INDEX: 34.57 KG/M2 | WEIGHT: 177 LBS | HEART RATE: 77 BPM | DIASTOLIC BLOOD PRESSURE: 69 MMHG | OXYGEN SATURATION: 100 % | SYSTOLIC BLOOD PRESSURE: 109 MMHG

## 2025-08-26 DIAGNOSIS — E78.5 HYPERLIPIDEMIA, UNSPECIFIED: ICD-10-CM

## 2025-08-26 DIAGNOSIS — I25.10 ATHEROSCLEROTIC HEART DISEASE OF NATIVE CORONARY ARTERY W/OUT ANGINA PECTORIS: ICD-10-CM

## 2025-08-26 PROCEDURE — 99213 OFFICE O/P EST LOW 20 MIN: CPT

## 2025-08-26 PROCEDURE — 93000 ELECTROCARDIOGRAM COMPLETE: CPT

## (undated) DEVICE — DRSG MAMMARY SUPPORT LG SIZE 4

## (undated) DEVICE — SUT POLYSORB 2-0 30" GS-21 UNDYED

## (undated) DEVICE — DRSG TEGADERM 1.75X1.75"

## (undated) DEVICE — POSITIONER FOAM EGG CRATE ULNAR 2PCS (PINK)

## (undated) DEVICE — DRSG COMBINE 5X9"

## (undated) DEVICE — SUT SOFSILK 2-0 18" C-23

## (undated) DEVICE — ELCTR STRYKER NEPTUNE SMOKE EVACUATION PENCIL (GREEN)

## (undated) DEVICE — DRAPE 1/2 SHEET 40X57"

## (undated) DEVICE — SOL IRR POUR NS 0.9% 500ML

## (undated) DEVICE — PACK MINOR

## (undated) DEVICE — GAMMA SLEEVE DISPOSABLE

## (undated) DEVICE — SYR LUER LOK 10CC

## (undated) DEVICE — DRAPE INSTRUMENT POUCH 6.75" X 11"

## (undated) DEVICE — SUT MONOCRYL 4-0 27" PS-2 UNDYED

## (undated) DEVICE — SUT MONOCRYL 3-0 27" PS-2 UNDYED

## (undated) DEVICE — DRSG MAMMARY SUPPORT MED SIZE 2

## (undated) DEVICE — SOL IRR POUR H2O 250ML

## (undated) DEVICE — DRAPE TOWEL BLUE 17" X 24"

## (undated) DEVICE — DRSG CURITY GAUZE SPONGE 4 X 4" 12-PLY

## (undated) DEVICE — SAVI SCOUT HANDPIECE

## (undated) DEVICE — LAP PAD 18 X 18"

## (undated) DEVICE — MEDICATION LABELS W MARKER

## (undated) DEVICE — SPECIMEN CONTAINER 100ML

## (undated) DEVICE — GLV 7.5 PROTEXIS (CREAM) NEU-THERA

## (undated) DEVICE — DRSG TELFA 3 X 8

## (undated) DEVICE — VENODYNE/SCD SLEEVE CALF MEDIUM

## (undated) DEVICE — WARMING BLANKET LOWER ADULT

## (undated) DEVICE — DRAPE CHEST BREAST 106" X 122"

## (undated) DEVICE — DRSG MAMMARY SUPPORT MED SIZE 3